# Patient Record
Sex: FEMALE | Race: WHITE | NOT HISPANIC OR LATINO | Employment: OTHER | URBAN - METROPOLITAN AREA
[De-identification: names, ages, dates, MRNs, and addresses within clinical notes are randomized per-mention and may not be internally consistent; named-entity substitution may affect disease eponyms.]

---

## 2023-09-20 PROBLEM — R26.2 DIFFICULTY WALKING: Status: ACTIVE | Noted: 2023-09-20

## 2023-09-20 PROBLEM — M19.90 DEGENERATIVE JOINT DISEASE: Status: ACTIVE | Noted: 2023-09-20

## 2023-09-20 PROBLEM — M54.30 SCIATICA: Status: ACTIVE | Noted: 2023-09-20

## 2023-09-20 PROBLEM — K21.9 GASTROESOPHAGEAL REFLUX DISEASE: Status: ACTIVE | Noted: 2023-09-20

## 2023-09-20 PROBLEM — M19.079 ARTHRITIS, MIDFOOT: Status: ACTIVE | Noted: 2023-09-20

## 2023-09-20 PROBLEM — J44.9 CHRONIC OBSTRUCTIVE PULMONARY DISEASE (MULTI): Status: ACTIVE | Noted: 2023-09-20

## 2023-09-20 PROBLEM — R26.9 GAIT ABNORMALITY: Status: ACTIVE | Noted: 2023-09-20

## 2023-09-20 PROBLEM — R53.1 WEAKNESS GENERALIZED: Status: ACTIVE | Noted: 2023-09-20

## 2023-09-20 PROBLEM — M47.816 LUMBAR SPONDYLOSIS: Status: ACTIVE | Noted: 2023-09-20

## 2023-09-20 RX ORDER — GABAPENTIN 300 MG/1
CAPSULE ORAL
COMMUNITY
Start: 2022-08-19 | End: 2024-01-30 | Stop reason: WASHOUT

## 2023-09-20 RX ORDER — CHOLECALCIFEROL (VITAMIN D3) 125 MCG
CAPSULE ORAL
COMMUNITY
Start: 2015-11-10

## 2023-09-20 RX ORDER — HYDROCODONE BITARTRATE AND ACETAMINOPHEN 5; 325 MG/1; MG/1
TABLET ORAL
COMMUNITY
End: 2024-01-30 | Stop reason: WASHOUT

## 2023-09-20 RX ORDER — ALBUTEROL SULFATE 90 UG/1
AEROSOL, METERED RESPIRATORY (INHALATION)
COMMUNITY
Start: 2020-12-14

## 2023-09-20 RX ORDER — ASPIRIN 325 MG
1 TABLET, DELAYED RELEASE (ENTERIC COATED) ORAL DAILY
COMMUNITY
Start: 2019-02-26

## 2023-09-20 RX ORDER — PHENYLPROPANOLAMINE/CLEMASTINE 75-1.34MG
TABLET, EXTENDED RELEASE ORAL
COMMUNITY
Start: 2015-11-10

## 2023-11-29 ENCOUNTER — TELEPHONE (OUTPATIENT)
Dept: PRIMARY CARE | Facility: CLINIC | Age: 78
End: 2023-11-29
Payer: MEDICARE

## 2023-11-29 DIAGNOSIS — J44.1 CHRONIC OBSTRUCTIVE PULMONARY DISEASE WITH ACUTE EXACERBATION (MULTI): Primary | ICD-10-CM

## 2023-11-29 RX ORDER — PREDNISONE 20 MG/1
40 TABLET ORAL DAILY
Qty: 10 TABLET | Refills: 0 | Status: SHIPPED | OUTPATIENT
Start: 2023-11-29 | End: 2023-12-04

## 2023-11-29 RX ORDER — AZITHROMYCIN 250 MG/1
TABLET, FILM COATED ORAL
Qty: 6 TABLET | Refills: 0 | Status: SHIPPED | OUTPATIENT
Start: 2023-11-29 | End: 2023-12-04

## 2024-01-29 PROBLEM — E78.2 MIXED HYPERLIPIDEMIA: Status: ACTIVE | Noted: 2024-01-29

## 2024-01-29 PROBLEM — M79.673 PAIN OF FOOT: Status: RESOLVED | Noted: 2024-01-29 | Resolved: 2024-01-29

## 2024-01-29 PROBLEM — S86.312A PERONEAL TENDON TEAR, LEFT, INITIAL ENCOUNTER: Status: RESOLVED | Noted: 2022-05-02 | Resolved: 2024-01-29

## 2024-01-29 PROBLEM — R73.9 HYPERGLYCEMIA, UNSPECIFIED: Status: ACTIVE | Noted: 2024-01-29

## 2024-01-29 PROBLEM — M15.9 GENERALIZED OSTEOARTHRITIS: Status: ACTIVE | Noted: 2024-01-29

## 2024-01-29 PROBLEM — R06.02 SHORTNESS OF BREATH: Status: RESOLVED | Noted: 2024-01-29 | Resolved: 2024-01-29

## 2024-01-29 PROBLEM — E55.9 VITAMIN D DEFICIENCY, UNSPECIFIED: Status: ACTIVE | Noted: 2024-01-29

## 2024-01-29 PROBLEM — M25.559 ARTHRALGIA OF HIP: Status: RESOLVED | Noted: 2024-01-29 | Resolved: 2024-01-29

## 2024-01-29 PROBLEM — R09.89 RESPIRATORY SYMPTOMS: Status: RESOLVED | Noted: 2024-01-29 | Resolved: 2024-01-29

## 2024-01-29 PROBLEM — E03.9 HYPOTHYROIDISM, UNSPECIFIED: Status: ACTIVE | Noted: 2024-01-29

## 2024-01-29 PROBLEM — M25.572 ACUTE LEFT ANKLE PAIN: Status: RESOLVED | Noted: 2022-05-02 | Resolved: 2024-01-29

## 2024-01-30 ENCOUNTER — OFFICE VISIT (OUTPATIENT)
Dept: PRIMARY CARE | Facility: CLINIC | Age: 79
End: 2024-01-30
Payer: MEDICARE

## 2024-01-30 VITALS
HEART RATE: 80 BPM | WEIGHT: 212 LBS | DIASTOLIC BLOOD PRESSURE: 80 MMHG | SYSTOLIC BLOOD PRESSURE: 122 MMHG | OXYGEN SATURATION: 97 % | BODY MASS INDEX: 31.31 KG/M2

## 2024-01-30 DIAGNOSIS — J32.9 SINUSITIS, UNSPECIFIED CHRONICITY, UNSPECIFIED LOCATION: Primary | ICD-10-CM

## 2024-01-30 PROCEDURE — 1036F TOBACCO NON-USER: CPT | Performed by: LICENSED PRACTICAL NURSE

## 2024-01-30 PROCEDURE — 99213 OFFICE O/P EST LOW 20 MIN: CPT | Performed by: LICENSED PRACTICAL NURSE

## 2024-01-30 PROCEDURE — 1160F RVW MEDS BY RX/DR IN RCRD: CPT | Performed by: LICENSED PRACTICAL NURSE

## 2024-01-30 PROCEDURE — 1159F MED LIST DOCD IN RCRD: CPT | Performed by: LICENSED PRACTICAL NURSE

## 2024-01-30 PROCEDURE — 1125F AMNT PAIN NOTED PAIN PRSNT: CPT | Performed by: LICENSED PRACTICAL NURSE

## 2024-01-30 RX ORDER — AMOXICILLIN AND CLAVULANATE POTASSIUM 875; 125 MG/1; MG/1
875 TABLET, FILM COATED ORAL 2 TIMES DAILY
Qty: 20 TABLET | Refills: 0 | Status: SHIPPED | OUTPATIENT
Start: 2024-01-30 | End: 2024-02-09

## 2024-01-30 RX ORDER — ASCORBIC ACID 250 MG
250 TABLET ORAL DAILY
COMMUNITY

## 2024-01-30 RX ORDER — CHOLECALCIFEROL (VITAMIN D3) 25 MCG
1000 TABLET ORAL DAILY
COMMUNITY

## 2024-01-30 RX ORDER — ACETAMINOPHEN 500 MG
500 TABLET ORAL EVERY 6 HOURS PRN
COMMUNITY

## 2024-01-30 RX ORDER — BISMUTH SUBSALICYLATE 262 MG
1 TABLET,CHEWABLE ORAL DAILY
COMMUNITY

## 2024-01-30 ASSESSMENT — PATIENT HEALTH QUESTIONNAIRE - PHQ9
2. FEELING DOWN, DEPRESSED OR HOPELESS: NOT AT ALL
SUM OF ALL RESPONSES TO PHQ9 QUESTIONS 1 AND 2: 0
1. LITTLE INTEREST OR PLEASURE IN DOING THINGS: NOT AT ALL

## 2024-01-30 ASSESSMENT — ENCOUNTER SYMPTOMS
SHORTNESS OF BREATH: 0
COUGH: 1
OCCASIONAL FEELINGS OF UNSTEADINESS: 1
LOSS OF SENSATION IN FEET: 0
RHINORRHEA: 1
DEPRESSION: 0

## 2024-01-30 ASSESSMENT — PAIN SCALES - GENERAL: PAINLEVEL: 8

## 2024-01-30 NOTE — PROGRESS NOTES
"[facility]: [facility]  PROGRESS NOTE      Mariaelena Goodwin is a 79 y.o. female that is presenting today for Follow-up (Cough, congestion at night, pt states that she can't cough up anything ).      Subjective   Pt presents to the office today for concerns of a cough that is unbearable at night. She notes onset of the cough 2 weeks ago. She notes also having a runny nose, nasal congestion, tender lymph nodes, and ear pain. She also had a sore throat for 3 days. She has tried Nyquil PM      Review of Systems   HENT:  Positive for congestion, ear pain and rhinorrhea.    Respiratory:  Positive for cough. Negative for shortness of breath.    Cardiovascular:  Negative for chest pain.      Objective   Vitals:    01/30/24 1004   BP: 122/80   Pulse: 80   SpO2: 97%      Body mass index is 31.31 kg/m².  Physical Exam  HENT:      Right Ear: Hearing normal. There is no impacted cerumen.      Left Ear: Hearing, tympanic membrane, ear canal and external ear normal. There is no impacted cerumen.      Ears:      Comments: Very minimal amount of erythema to the right TM.   Cardiovascular:      Rate and Rhythm: Normal rate and regular rhythm.   Pulmonary:      Breath sounds: Normal breath sounds. No decreased breath sounds, wheezing, rhonchi or rales.       Diagnostic Results   Lab Results   Component Value Date    GLUCOSE 110 (H) 02/26/2019    CALCIUM 8.9 02/26/2019     02/26/2019    K 4.0 02/26/2019    CO2 26 02/26/2019     02/26/2019    BUN 8 02/26/2019    CREATININE 0.95 02/26/2019     No results found for: \"ALT\", \"AST\", \"GGT\", \"ALKPHOS\", \"BILITOT\"  Lab Results   Component Value Date    WBC 7.3 02/26/2019    HGB 13.7 02/26/2019    HCT 41.3 02/26/2019    MCV 93 02/26/2019     02/26/2019     No results found for: \"CHOL\"  No results found for: \"HDL\"  No results found for: \"LDLCALC\"  No results found for: \"TRIG\"  No components found for: \"CHOLHDL\"  No results found for: \"HGBA1C\"  Other labs " not included in the list above were reviewed either before or during this encounter.    History    Past Medical History:   Diagnosis Date    Acute left ankle pain 05/02/2022    Arthralgia of hip 01/29/2024    Pain of foot 01/29/2024    Peroneal tendon tear, left, initial encounter 05/02/2022    Personal history of other diseases of the respiratory system 11/20/2020    History of sinus problem    Respiratory symptoms 01/29/2024    Shortness of breath 01/29/2024     Past Surgical History:   Procedure Laterality Date    OTHER SURGICAL HISTORY  11/20/2020    Hip replacement    OTHER SURGICAL HISTORY  11/20/2020    Appendectomy    ROTATOR CUFF REPAIR  11/10/2015    Rotator Cuff Repair    TOTAL KNEE ARTHROPLASTY  11/10/2015    Knee Replacement     Family History   Problem Relation Name Age of Onset    Heart disease Mother      Hypertension Mother       Social History     Socioeconomic History    Marital status:      Spouse name: Not on file    Number of children: Not on file    Years of education: Not on file    Highest education level: Not on file   Occupational History    Not on file   Tobacco Use    Smoking status: Never    Smokeless tobacco: Never   Vaping Use    Vaping Use: Never used   Substance and Sexual Activity    Alcohol use: Yes     Alcohol/week: 7.0 standard drinks of alcohol     Types: 7 Standard drinks or equivalent per week    Drug use: Never    Sexual activity: Not on file   Other Topics Concern    Not on file   Social History Narrative    Not on file     Social Determinants of Health     Financial Resource Strain: Not on file   Food Insecurity: Not on file   Transportation Needs: Not on file   Physical Activity: Not on file   Stress: Not on file   Social Connections: Not on file   Intimate Partner Violence: Not on file   Housing Stability: Not on file     Allergies   Allergen Reactions    Latex Rash     Current Outpatient Medications on File Prior to Visit   Medication Sig Dispense Refill     albuterol (Ventolin HFA) 90 mcg/actuation inhaler INHALE 1-2 PUFFS EVERY 4-6 HOURS AS NEEDED AND AS DIRECTED.      ascorbic acid (Vitamin C) 250 mg tablet Take 1 tablet (250 mg) by mouth once daily.      aspirin 325 mg EC tablet Take 1 tablet (325 mg) by mouth once daily.      cholecalciferol (Vitamin D3) 25 MCG (1000 UT) tablet Take 1 tablet (1,000 Units) by mouth once daily.      ibuprofen (Advil Migraine) capsule TAKE 1-2 CAPSULES  EVERY 4 TO 6 HOURS.      multivitamin tablet Take 1 tablet by mouth once daily.      naproxen sodium (Aleve) 220 mg capsule Take 1 capsule twice daily as needed      acetaminophen (Tylenol) 500 mg tablet Take 1 tablet (500 mg) by mouth every 6 hours if needed for mild pain (1 - 3).      [DISCONTINUED] gabapentin (Neurontin) 300 mg capsule 1 capsule Orally every 12 hours for 30 day(s)      [DISCONTINUED] HYDROcodone-acetaminophen (Norco) 5-325 mg tablet 1 tab(s) orally every 6 hours       No current facility-administered medications on file prior to visit.     Immunization History   Administered Date(s) Administered    Flu vaccine, quadrivalent, high-dose, preservative free, age 65y+ (FLUZONE) 09/12/2023    Hepatitis A vaccine, age 19 years and greater (HAVRIX) 11/10/2015, 02/07/2017    Influenza, High Dose Seasonal, Preservative Free 10/17/2017, 09/28/2018    Influenza, seasonal, injectable 02/07/2017    Vivian SARS-CoV-2 Vaccination 08/05/2021, 11/29/2021    Tdap vaccine, age 7 year and older (BOOSTRIX, ADACEL) 10/01/2011    Typhoid, oral 02/07/2017     Patient's medical history was reviewed and updated either before or during this encounter.       Assessment/Plan   Problem List Items Addressed This Visit    None  Visit Diagnoses       Sinusitis, unspecified chronicity, unspecified location    -  Primary    Relevant Medications    amoxicillin-pot clavulanate (Augmentin) 875-125 mg tablet            Robbie Guerra, APRN-CNP

## 2024-01-30 NOTE — PATIENT INSTRUCTIONS
It was nice meeting you today Mariaelena. I'm sorry you have been experiencing this cough. I'll prescribe you an ATB and this should help you.

## 2024-02-12 ENCOUNTER — TELEPHONE (OUTPATIENT)
Dept: PRIMARY CARE | Facility: CLINIC | Age: 79
End: 2024-02-12
Payer: MEDICARE

## 2024-02-12 DIAGNOSIS — J32.9 SINUSITIS, UNSPECIFIED CHRONICITY, UNSPECIFIED LOCATION: Primary | ICD-10-CM

## 2024-02-12 RX ORDER — DOXYCYCLINE 100 MG/1
100 CAPSULE ORAL 2 TIMES DAILY
Qty: 14 CAPSULE | Refills: 0 | Status: SHIPPED | OUTPATIENT
Start: 2024-02-12 | End: 2024-02-19

## 2024-02-12 NOTE — TELEPHONE ENCOUNTER
Pt c/o unresolved fatigue, sinus congestion and cough at night. States that it went away after atb and started again 2 days ago. Negative COVID test 3 days ago. Please advise.

## 2024-08-20 ENCOUNTER — PREP FOR PROCEDURE (OUTPATIENT)
Dept: ORTHOPEDIC SURGERY | Facility: HOSPITAL | Age: 79
End: 2024-08-20
Payer: MEDICARE

## 2024-08-20 DIAGNOSIS — M17.11 PRIMARY OSTEOARTHRITIS OF RIGHT KNEE: Primary | ICD-10-CM

## 2024-08-20 RX ORDER — SODIUM CHLORIDE, SODIUM LACTATE, POTASSIUM CHLORIDE, CALCIUM CHLORIDE 600; 310; 30; 20 MG/100ML; MG/100ML; MG/100ML; MG/100ML
20 INJECTION, SOLUTION INTRAVENOUS CONTINUOUS
OUTPATIENT
Start: 2024-08-20

## 2024-08-29 ENCOUNTER — TELEPHONE (OUTPATIENT)
Dept: INPATIENT UNIT | Facility: HOSPITAL | Age: 79
End: 2024-08-29
Payer: MEDICARE

## 2024-08-29 NOTE — CARE PLAN
Notified by Ortho coordinator that patient had questions about discharge planning after her surgery on 09/23/24. Spoke to Mrs Goodwin with ortho coordinator Lalita via phone. Explained to her that she would not be able to go to [facility] under her insurance because she will most likely not meet inpatient criteria (joint replacements are classified as extended recovery) per medicare guidelines. Offered the option of private pay to [facility] and patient had expressed how much would that cost. TCC called over to [facility] to see what private pay will cost the patient. Spoke to [facility] admissions and she stated that the private pay rate is $435.00/day. Weekly rate is $3,045.00. Admission to [facility] will depend on day of discharge will also depend on bed availability. this will all depend on bed availability at the time of discharge. Will reach out to patient to make her aware of the private pay cost.

## 2024-08-30 NOTE — CARE PLAN
Spoke with patient over the phone and discussed with her the private pay rate for [facility]. Explained the daily rate as well as what the weekly rate would be. Patient would like to speak to her  about the cost before a decision is made. Patient will let the team know when she has her surgery if she would like to go to [facility] for a week private pay after she is discharged from the hospital.

## 2024-09-05 ENCOUNTER — PRE-ADMISSION TESTING (OUTPATIENT)
Dept: PREADMISSION TESTING | Facility: HOSPITAL | Age: 79
End: 2024-09-05
Payer: MEDICARE

## 2024-09-05 DIAGNOSIS — Z01.818 PRE-OPERATIVE EXAMINATION: Primary | ICD-10-CM

## 2024-09-05 DIAGNOSIS — M17.11 PRIMARY OSTEOARTHRITIS OF RIGHT KNEE: ICD-10-CM

## 2024-09-05 LAB
ALBUMIN SERPL BCP-MCNC: 4 G/DL (ref 3.4–5)
ALP SERPL-CCNC: 67 U/L (ref 33–136)
ALT SERPL W P-5'-P-CCNC: 14 U/L (ref 7–45)
ANION GAP SERPL CALC-SCNC: 11 MMOL/L (ref 10–20)
AST SERPL W P-5'-P-CCNC: 16 U/L (ref 9–39)
ATRIAL RATE: 67 BPM
BASOPHILS # BLD AUTO: 0.04 X10*3/UL (ref 0–0.1)
BASOPHILS NFR BLD AUTO: 0.6 %
BILIRUB SERPL-MCNC: 0.8 MG/DL (ref 0–1.2)
BUN SERPL-MCNC: 13 MG/DL (ref 6–23)
CALCIUM SERPL-MCNC: 9.6 MG/DL (ref 8.6–10.3)
CHLORIDE SERPL-SCNC: 104 MMOL/L (ref 98–107)
CO2 SERPL-SCNC: 28 MMOL/L (ref 21–32)
CREAT SERPL-MCNC: 0.74 MG/DL (ref 0.5–1.05)
EGFRCR SERPLBLD CKD-EPI 2021: 82 ML/MIN/1.73M*2
EOSINOPHIL # BLD AUTO: 0.24 X10*3/UL (ref 0–0.4)
EOSINOPHIL NFR BLD AUTO: 3.8 %
ERYTHROCYTE [DISTWIDTH] IN BLOOD BY AUTOMATED COUNT: 12.8 % (ref 11.5–14.5)
GLUCOSE SERPL-MCNC: 76 MG/DL (ref 74–99)
HCT VFR BLD AUTO: 45.4 % (ref 36–46)
HGB BLD-MCNC: 15.3 G/DL (ref 12–16)
IMM GRANULOCYTES # BLD AUTO: 0.03 X10*3/UL (ref 0–0.5)
IMM GRANULOCYTES NFR BLD AUTO: 0.5 % (ref 0–0.9)
LYMPHOCYTES # BLD AUTO: 2.02 X10*3/UL (ref 0.8–3)
LYMPHOCYTES NFR BLD AUTO: 32 %
MCH RBC QN AUTO: 31.1 PG (ref 26–34)
MCHC RBC AUTO-ENTMCNC: 33.7 G/DL (ref 32–36)
MCV RBC AUTO: 92 FL (ref 80–100)
MONOCYTES # BLD AUTO: 0.41 X10*3/UL (ref 0.05–0.8)
MONOCYTES NFR BLD AUTO: 6.5 %
NEUTROPHILS # BLD AUTO: 3.57 X10*3/UL (ref 1.6–5.5)
NEUTROPHILS NFR BLD AUTO: 56.6 %
NRBC BLD-RTO: 0 /100 WBCS (ref 0–0)
P AXIS: 43 DEGREES
P OFFSET: 206 MS
P ONSET: 155 MS
PLATELET # BLD AUTO: 304 X10*3/UL (ref 150–450)
POTASSIUM SERPL-SCNC: 4.4 MMOL/L (ref 3.5–5.3)
PR INTERVAL: 142 MS
PROT SERPL-MCNC: 6.3 G/DL (ref 6.4–8.2)
Q ONSET: 226 MS
QRS COUNT: 11 BEATS
QRS DURATION: 80 MS
QT INTERVAL: 416 MS
QTC CALCULATION(BAZETT): 439 MS
QTC FREDERICIA: 431 MS
R AXIS: 4 DEGREES
RBC # BLD AUTO: 4.92 X10*6/UL (ref 4–5.2)
SODIUM SERPL-SCNC: 139 MMOL/L (ref 136–145)
T AXIS: 28 DEGREES
T OFFSET: 434 MS
VENTRICULAR RATE: 67 BPM
WBC # BLD AUTO: 6.3 X10*3/UL (ref 4.4–11.3)

## 2024-09-05 PROCEDURE — 84075 ASSAY ALKALINE PHOSPHATASE: CPT | Performed by: REGISTERED NURSE

## 2024-09-05 PROCEDURE — 85025 COMPLETE CBC W/AUTO DIFF WBC: CPT | Performed by: REGISTERED NURSE

## 2024-09-05 PROCEDURE — 36415 COLL VENOUS BLD VENIPUNCTURE: CPT

## 2024-09-05 PROCEDURE — 87081 CULTURE SCREEN ONLY: CPT | Mod: GEALAB | Performed by: REGISTERED NURSE

## 2024-09-05 PROCEDURE — 93005 ELECTROCARDIOGRAM TRACING: CPT

## 2024-09-05 PROCEDURE — 99204 OFFICE O/P NEW MOD 45 MIN: CPT | Performed by: REGISTERED NURSE

## 2024-09-05 RX ORDER — ASPIRIN 81 MG/1
81 TABLET ORAL DAILY
COMMUNITY

## 2024-09-05 RX ORDER — CHLORHEXIDINE GLUCONATE ORAL RINSE 1.2 MG/ML
15 SOLUTION DENTAL DAILY
Qty: 120 ML | Refills: 0 | Status: SHIPPED | OUTPATIENT
Start: 2024-09-05 | End: 2024-09-07

## 2024-09-05 ASSESSMENT — ENCOUNTER SYMPTOMS
CONSTITUTIONAL NEGATIVE: 1
EYES NEGATIVE: 1
RESPIRATORY NEGATIVE: 1
NECK NEGATIVE: 1
GASTROINTESTINAL NEGATIVE: 1
NEUROLOGICAL NEGATIVE: 1
ARTHRALGIAS: 1
SKIN CHANGES: 1
CARDIOVASCULAR NEGATIVE: 1
LIMITED RANGE OF MOTION: 1

## 2024-09-05 ASSESSMENT — PAIN SCALES - GENERAL: PAINLEVEL_OUTOF10: 8

## 2024-09-05 ASSESSMENT — DUKE ACTIVITY SCORE INDEX (DASI)
TOTAL_SCORE: 28.95
CAN YOU RUN A SHORT DISTANCE: NO
CAN YOU WALK INDOORS, SUCH AS AROUND YOUR HOUSE: YES
CAN YOU CLIMB A FLIGHT OF STAIRS OR WALK UP A HILL: YES
CAN YOU DO HEAVY WORK AROUND THE HOUSE LIKE SCRUBBING FLOORS OR LIFTING AND MOVING HEAVY FURNITURE: NO
CAN YOU WALK A BLOCK OR TWO ON LEVEL GROUND: NO
CAN YOU DO MODERATE WORK AROUND THE HOUSE LIKE VACUUMING, SWEEPING FLOORS OR CARRYING GROCERIES: YES
CAN YOU PARTICIPATE IN MODERATE RECREATIONAL ACTIVITIES LIKE GOLF, BOWLING, DANCING, DOUBLES TENNIS OR THROWING A BASEBALL OR FOOTBALL: NO
DASI METS SCORE: 6.3
CAN YOU HAVE SEXUAL RELATIONS: YES
CAN YOU TAKE CARE OF YOURSELF (EAT, DRESS, BATHE, OR USE TOILET): YES
CAN YOU DO LIGHT WORK AROUND THE HOUSE LIKE DUSTING OR WASHING DISHES: YES
CAN YOU DO YARD WORK LIKE RAKING LEAVES, WEEDING OR PUSHING A MOWER: NO
CAN YOU PARTICIPATE IN STRENOUS SPORTS LIKE SWIMMING, SINGLES TENNIS, FOOTBALL, BASKETBALL, OR SKIING: YES

## 2024-09-05 ASSESSMENT — PAIN - FUNCTIONAL ASSESSMENT: PAIN_FUNCTIONAL_ASSESSMENT: 0-10

## 2024-09-05 ASSESSMENT — LIFESTYLE VARIABLES: SMOKING_STATUS: NONSMOKER

## 2024-09-05 ASSESSMENT — ACTIVITIES OF DAILY LIVING (ADL): ADL_SCORE: 0

## 2024-09-05 NOTE — SIGNIFICANT EVENT
Procedure:  Right total knee arthroplasty    Date of procedure:  9/23/24    Address patient is going to be discharged to:  [address] [id number]    Who do you reside with at home and will someone be available to stay with you for at least the first 24 hours:  States she does not have reliable person to be with her for first 24 hours. States her  might not be able to be with her and therefore she spoke with Dr. Arambula about this and she wants to stay overnight.    Did you or are you going to [facility], if so, what date:  Not planning to go.   States she went in 2019 when she did L TKR.    What is your current mobility status:  Ambulates with assistance of surroundings or leaning on another person. Has a cane but does not use it.     Stairs in home:  One flight, bedroom is upstairs    Equipment at home:  Raised toilet seat    All chronic medications refilled and available at home:  No rx medications    [facility] for [facility] medications:  [facility]    Same day or overnight stay:  overnight

## 2024-09-05 NOTE — H&P (VIEW-ONLY)
CPM/PAT Evaluation       Name: Mariaelena Goodwin (Mariaelena Goodwin)  /Age: 1945/79 y.o.     In-Person       Chief Complaint: Evaluation prior to surgery    HPI    Past Medical History:   Diagnosis Date    Acute left ankle pain 2022    Arthralgia of hip 2024    Arthritis     COPD (chronic obstructive pulmonary disease) (Multi)     Pain of foot 2024    Peroneal tendon tear, left, initial encounter 2022    Personal history of other diseases of the respiratory system 2020    History of sinus problem    Respiratory symptoms 2024    Shortness of breath 2024       Past Surgical History:   Procedure Laterality Date    CATARACT EXTRACTION      OTHER SURGICAL HISTORY Right     Hip replacement    OTHER SURGICAL HISTORY      Appendectomy    ROTATOR CUFF REPAIR Right     Rotator Cuff Repair    TOTAL KNEE ARTHROPLASTY Left     Knee Replacement       Patient  has no history on file for sexual activity.    Family History   Problem Relation Name Age of Onset    Heart disease Mother      Hypertension Mother         Allergies   Allergen Reactions    Latex Rash       Prior to Admission medications    Medication Sig Start Date End Date Taking? Authorizing Provider   ascorbic acid (Vitamin C) 250 mg tablet Take 1 tablet (250 mg) by mouth once daily.   Yes Historical Provider, MD   aspirin 81 mg EC tablet Take 1 tablet (81 mg) by mouth once daily.   Yes Historical Provider, MD   cholecalciferol (Vitamin D3) 25 MCG (1000 UT) tablet Take 1 tablet (1,000 Units) by mouth once daily.   Yes Historical Provider, MD   ibuprofen/diphenhydramine cit (ADVIL PM ORAL) Take by mouth.   Yes Historical Provider, MD   multivitamin tablet Take 1 tablet by mouth once daily.   Yes Historical Provider, MD   acetaminophen (Tylenol) 500 mg tablet Take 1 tablet (500 mg) by mouth every 6 hours if needed for mild pain (1 - 3).    Historical Provider, MD   albuterol (Ventolin HFA) 90 mcg/actuation inhaler INHALE 1-2  PUFFS EVERY 4-6 HOURS AS NEEDED AND AS DIRECTED. 12/14/20   Historical Provider, MD   chlorhexidine (Peridex) 0.12 % solution Use 15 mL in the mouth or throat once daily for 2 days. Use 15ml once the night before surgery and 15ml once the morning of surgery 9/5/24 9/7/24  Janeen Vargas, APRN-CNP   aspirin 325 mg EC tablet Take 1 tablet (325 mg) by mouth once daily. 2/26/19 9/5/24  Historical Provider, MD   ibuprofen (Advil Migraine) capsule TAKE 1-2 CAPSULES  EVERY 4 TO 6 HOURS. 11/10/15 9/5/24  Historical Provider, MD   naproxen sodium (Aleve) 220 mg capsule Take 1 capsule twice daily as needed 11/10/15 9/5/24  Historical Provider, MD BRIZUELA ROS:   Constitutional:   neg    Neuro/Psych:   neg    Eyes:   neg    Ears:   neg    Nose:   Mouth:   neg    Throat:   neg    Neck:    Soreness with ROM  neg    Cardio:   neg    Respiratory:   neg    Endocrine:   GI:   neg    :   neg    Musculoskeletal:    Right knee pain 8/10, chronic pain Left ankle   arthralgias   decreased ROM  Hematologic:   neg    Skin:   Healing small intact area on left lower arm, no redness or drainage,    skin changes      Physical Exam  Vitals reviewed.   Constitutional:       Appearance: Normal appearance.   HENT:      Head: Normocephalic and atraumatic.      Nose: Nose normal.      Mouth/Throat:      Mouth: Mucous membranes are moist.      Pharynx: Oropharynx is clear.   Neck:      Vascular: No carotid bruit.      Comments: Soreness with ROM  Cardiovascular:      Rate and Rhythm: Normal rate and regular rhythm.      Pulses: Normal pulses.      Heart sounds: Normal heart sounds.   Pulmonary:      Effort: Pulmonary effort is normal.      Breath sounds: Normal breath sounds.   Abdominal:      Palpations: Abdomen is soft.   Musculoskeletal:         General: Tenderness present. No swelling.      Cervical back: Normal range of motion and neck supple.      Right lower leg: No edema.      Left lower leg: No edema.      Comments: Abnormal gait due  to Left ankle pain and Right knee pain   Skin:     General: Skin is warm and dry.      Capillary Refill: Capillary refill takes less than 2 seconds.   Neurological:      General: No focal deficit present.      Mental Status: She is alert and oriented to person, place, and time.   Psychiatric:         Mood and Affect: Mood normal.         Behavior: Behavior normal.         Thought Content: Thought content normal.         Judgment: Judgment normal.          PAT AIRWAY:   Airway:     Mallampati::  IV    TM distance::  >3 FB    Neck ROM::  Limited  normal        There were no vitals taken for this visit.    DASI Risk Score      Flowsheet Row Most Recent Value   DASI SCORE 28.95   METS Score (Will be calculated only when all the questions are answered) 6.3          Caprini DVT Assessment      Flowsheet Row Most Recent Value   DVT Score 12   Current Status COPD, Major surgery planned, including arthroscopic and laproscopic (1-2 hours), Elective major lower extremity arthroplasty   History Prior major surgery   Age Over 75 years          Modified Frailty Index    No data to display       CHADS2 Stroke Risk  Current as of 48 minutes ago        N/A 3 to 100%: High Risk   2 to < 3%: Medium Risk   0 to < 2%: Low Risk     Last Change: N/A          This score determines the patient's risk of having a stroke if the patient has atrial fibrillation.        This score is not applicable to this patient. Components are not calculated.          Revised Cardiac Risk Index      Flowsheet Row Most Recent Value   Revised Cardiac Risk Calculator 0          Apfel Simplified Score      Flowsheet Row Most Recent Value   Apfel Simplified Score Calculator 3          Risk Analysis Index Results This Encounter         9/5/2024  0919             REED Cancer History: Patient does not indicate history of cancer    Total Risk Analysis Index Score Without Cancer: 24    Total Risk Analysis Index Score: 24          Stop Bang Score      Flowsheet Row Most  Recent Value   Do you snore loudly? 0   Do you often feel tired or fatigued after your sleep? 0   Has anyone ever observed you stop breathing in your sleep? 0   Do you have or are you being treated for high blood pressure? 0   Recent BMI (Calculated) 30.9   Is BMI greater than 35 kg/m2? 0=No   Age older than 50 years old? 1=Yes   Is your neck circumference greater than 17 inches (Male) or 16 inches (Female)? 0   Gender - Male 0=No   STOP-BANG Total Score 1              Assessment & Plan:    79 y.o.  female  scheduled for OPEN TOTAL KNEE ARTHROPLASTY - Right on 9/23/24 with Dr. Arambula for  Primary osteoarthritis of right knee.  PMHX includes HLD, hypothyroid, GERD, COPD, Sciatica, Spondylosis of lumbar spine, OA.  PAT consulted for perioperative risk stratification and optimization    Neuro:  No neurologic diagnosis or significant findings on chart review, clinical presentation and evaluation.  No grossly apparent neurologic perioperative risk.  age  Patient is at increased risk for perioperative CVA secondary to  increased age    HEENT:  No HEENT diagnosis or significant findings on chart review or clinical presentation and evaluation. No further preoperative testing/intervention indicated at this time.    Cardiovascular:  Hx HLD, diet controlled  METS: 6.3  RCRI: 0 points, 3.9%  risk for postoperative MACE   ELLIS: 0.8% risk for 30 day postoperative MACE    Pulmonary:  Hx COPD, has not used inhaler in over one month  Stop Bang score is 1 placing patient at low risk for TANNA  ARISCAT: <26 points, 1.6% risk of in-hospital postoperative pulmonary complication  PRODIGY: Moderate risk for opioid induced respiratory depression  Pulmonary education discussed, patient also provided deep breathing exercises and educational handout    Renal:   No renal diagnosis or significant findings on chart review, clinical presentation or evaluation. No further preoperative testing/intervention is indicated at this time.  Age equal to  or greater than 56    Urological/GYN  No diagnosis or significant findings on chart review or clinical presentation and evaluation.     Endocrine:  Hx hypothyroid    Hematologic:  No hematologic diagnosis or significant findings on chart review or clinical presentation and evaluation. No further testing or intervention is indicated at this time.     Antiplatelet management   Patient takes ASA 81 as cardio preventative  Anticoagulation management  The patient is not currently receiving anticoagulation therapy. Patient provided with DVT educational handout.    Caprini Score 12, patient at High risk for perioperative DVT.  Patient provided with VTE education/handout.    Gastrointestinal:   Hx GERD  Eat-10 score 0      Infectious disease:   No infectious diagnosis or significant findings on chart review or clinical presentation and evaluation.   Prescription provided for CHG body wash and dental rinse. CHG use instructions reviewed and provided to patient.  Staph screen collected    Musculoskeletal:   Primary osteoarthritis of right knee  Hx R SPENSER 2019, L TKA, R Rotator Cuff Repair, Left ankle chronic pain, Sciatica, Spondylosis of lumbar spine        Anesthesia/Airway:  No anesthesia complications      Medication instructions and NPO guidelines reviewed with the patient.  All questions or concerns discussed and addressed.      Labs and EKG ordered   CBC  CMP  MRSA  Peridex

## 2024-09-05 NOTE — PREPROCEDURE INSTRUCTIONS
Medication List            Accurate as of September 5, 2024  9:50 AM. Always use your most recent med list.                acetaminophen 500 mg tablet  Commonly known as: Tylenol  Medication Adjustments for Surgery: Take/Use as prescribed     ADVIL PM ORAL  Additional Medication Adjustments for Surgery: Take last dose 7 days before surgery     ascorbic acid 250 mg tablet  Commonly known as: Vitamin C  Additional Medication Adjustments for Surgery: Take last dose 7 days before surgery     aspirin 81 mg EC tablet  Additional Medication Adjustments for Surgery: Take last dose 7 days before surgery     chlorhexidine 0.12 % solution  Commonly known as: Peridex  Use 15 mL in the mouth or throat once daily for 2 days. Use 15ml once the night before surgery and 15ml once the morning of surgery  Medication Adjustments for Surgery: Take/Use as prescribed     multivitamin tablet  Additional Medication Adjustments for Surgery: Take last dose 7 days before surgery     Ventolin HFA 90 mcg/actuation inhaler  Generic drug: albuterol  Medication Adjustments for Surgery: Take/Use as prescribed     Vitamin D3 25 MCG (1000 UT) tablet  Generic drug: cholecalciferol  Additional Medication Adjustments for Surgery: Take last dose 7 days before surgery                                Thank you for visiting [facility]) today for your pre-procedure evaluation, you were seen by     Janeen Vargas CNP  [facility]mission [facility]  [facility]  [phone]    This summary includes instructions and information to aid you during your perioperative period.  Please read carefully. If you have any questions about your visit today, please call the number listed above.  If you become ill or have any changes to your health before your surgery, please contact your primary care provider and alert your surgeon.    Preparing for your Surgery       Exercises  Preoperative Deep Breathing Exercises  Why it is important to do deep  breathing exercises before my surgery?  Deep breathing exercises strengthen your breathing muscles.  This helps you to recover after your surgery and decreases the chance of breathing complications.  How are the deep breathing exercises done?  Sit straight with your back supported.  Breathe in deeply and slowly through your nose. Your lower rib cage should expand and your abdomen may move forward.  Hold that breath for 3 to 5 seconds.  Breathe out through pursed lips, slowly and completely.  Rest and repeat 10 times every hour while awake.  Rest longer if you become dizzy or lightheaded.       Preoperative Brain Exercises    What are brain exercises?  A brain exercise is any activity that engages your thinking (cognitive) skills.    What types of activities are considered brain exercises?  Jigsaw puzzles, crossword puzzles, word jumble, memory games, word search, and many more.  Many can be found free online or on your phone via a mobile flo.    Why should I do brain exercises before my surgery?  More recent research has shown brain exercise before surgery can lower the risk of postoperative delirium (confusion) which can be especially important for older adults.  Patients who did brain exercises for 5 to 10 hours the days before surgery, cut their risk of postoperative delirium in half up to 1 week after surgery.    Sit-to-Stand Exercise    What is the sit-to-stand exercise?  The sit-to-stand exercise strengthens the muscles of your lower body and muscles in the center of your body (core muscles for stability) helping to maintain and improve your strength and mobility.  How do I do the sit-to-stand exercise?  The goal is to do this exercise without using your arms or hands.  If this is too difficult, use your arms and hands or a chair with armrests to help slowly push yourself to the standing position and lower yourself back to the sitting position. As the movement becomes easier use your arms and hands less.     Steps to the sit-to-stand exercise  Sit up tall in a sturdy chair, knees bent, feet flat on the floor shoulder-width apart.  Shift your hips/pelvis forward in the chair to correctly position yourself for the next movement.  Lean forward at your hips.  Stand up straight putting equal weight on both feet.  Check to be sure you are properly aligned with the chair, in a slow controlled movement sit back down.  Repeat this exercise 10-15 times.  If needed you can do it fewer times until your strength improves.  Rest for 1 minute.  Do another 10-15 sit-to-stand exercises.  Try to do this in the morning and evening.        Instructions    Preoperative Fasting Guidelines    Why must I stop eating and drinking near surgery time?  With sedation, food or liquid in your stomach can enter your lungs causing serious complications  Food can increase nausea and vomiting  When do I need to stop eating and drinking before my surgery?      Do not eat any food or drink any liquids after midnight the night before your surgery/procedure.  You may have sips of water to take medications.            Simple things you can do to help prevent blood clots     Blood clots are blockages that can form in the body's veins. When a blood clot forms in your deep veins, it may be called a deep vein thrombosis, or DVT for short. Blood clots can happen in any part of the body where blood flows, but they are most common in the arms and legs. If a piece of a blood clot breaks free and travels to the lungs, it is called a pulmonary embolus (PE). A PE can be a very serious problem.         Being in the hospital or having surgery can raise your chances of getting a blood clot because you may not be well enough to move around as much as you normally do.         Ways you can help prevent blood clots in the hospital       Wearing SCDs  SCDs stands for Sequential Compression Devices.   SCDs are special sleeves that wrap around your legs. They attach to a pump  "that fills them with air to gently squeeze your legs every few minutes.  This helps return the blood in your legs to your heart.   SCDs should only be taken off when walking or bathing. SCDs may not be comfortable, but they can help save your life.              Pump SCD leg sleeves  Wearing compression stockings - if your doctor orders them. These special snug-fitting stockings gently squeeze your legs to help blood flow.       Walking. Walking helps move the blood in your legs.   If your doctor says it is ok, try walking the halls at least   5 times a day. Ask us to help you get up, so you don't fall.      Taking any blood-thinning medicines your doctor orders.              Ways you can help prevent blood clots at home         Wearing compression stockings - if your doctor orders them.   Walking - to help move the blood in your legs.    Taking any blood-thinning medicines your doctor orders.      Signs of a blood clot or PE    Tell your doctor or nurse right away if you have any of the problems listed below.         If you are at home, seek medical care right away. Call 911 for chest pain or problems breathing.            Signs of a blood clot (DVT) - such as pain, swelling, redness, or warmth in your arm or legs.  Signs of a pulmonary embolism (PE) - such as chest pain or feeling short of breath      Tobacco and Alcohol;  Do not drink alcohol or smoke within 24 hours of surgery.  It is best to quit smoking for as long as possible before any surgery or procedure.      Other Instructions  Why did I have my nose, under my arms, and groin swabbed? The purpose of the swab is to identify Staphylococcus aureus inside your nose or on your skin.  The swab was sent to the laboratory for culture.  A positive swab/culture for Staphylococcus aureus is called colonization or carriage.     What is Staphylococcus aureus? Staphylococcus aureus, also known as \"staph\", is a germ found on the skin or in the nose of healthy people.  " Sometimes Staphylococcus aureus can get into the body and cause an infection.  This can be minor (such as pimples, boils, or other skin problems).  It might also be serious (such as a blood infection, pneumonia, or a surgical site infection).     What is Staphylococcus aureus colonization or carriage? Colonization or carriage means that a person has the germ but is not sick from it.  These bacteria can be spread on the hands or when breathing or sneezing.   How is Staphylococcus aureus spread? It is most often spread by close contact with a person or item that carries it.   What happens if my culture is positive for Staphylococcus aureus? Your doctor/medical team will use this information to guide any antibiotic treatment which may be necessary.  Regardless of the culture results, we will clean the inside of your nose with a betadine swab just before you have your surgery.   Will I get an infection if I have Staphylococcus aureus in my nose or on my skin? Anyone can get an infection with Staphylococcus aureus.  However, the best way to reduce your risk of infection is to follow the instructions provided to you for the use of your CHG soap and dental rinse.      Body Wash:     What is a home preoperative antibacterial shower? This shower is a way of cleaning the skin with a germ-killing solution before surgery.  The solution contains chlorhexidine, commonly known as CHG.  CHG is a skin cleanser with germ-killing ability.  Let your doctor know if you are allergic to chlorhexidine.   Why do I need to take a preoperative antibacterial shower? Skin is not sterile.  It is best to try to make your skin as free of germs as possible before surgery.  Proper cleansing with a germ-killing soap before surgery can lower the number of germs on your skin.  This helps to reduce the risk of infection at the surgical site.    Following the instructions listed below will help you prepare your skin for surgery.   How do I use the  solution? Steps:  Begin using your CHG soap 5 days before your scheduled surgery.      Oral/Dental Rinse:     What is oral/dental rinse?  It is a mouthwash. It is a way of cleaning the mouth with a germ-killing solution before your surgery.  The solution contains chlorhexidine, commonly known as CHG. It is used inside the mouth to kill a bacteria known as Staphylococcus aureus.  Let your doctor know if you are allergic to Chlorhexidine.   Why do I need to use CHG oral/dental rinse? The CHG oral/dental rinse helps to kill a bacteria in your mouth known as Staphylococcus aureus.  This reduces the risk of infection at the surgical site.    Using your CHG oral/dental rinse STEPS: Use your CHG oral/dental rinse after you brush your teeth the night before (at bedtime) and the morning of your surgery.  Follow all directions on your prescription label.    Use the cap on the container to measure 15 ml.  Swish (gargle if you can) the mouthwash in your mouth for at least 30 seconds, (do not swallow) and spit out.  After you use your CHG rinse, do not rinse your mouth with water, drink or eat.    Please refer to the prescription label for the appropriate time to resume oral intake   What side effects might I have using the CHG oral/dental rinse? CHG rinse will stick to plaque on the teeth.  Brush and floss just before use.  Teeth brushing will help avoid staining of plaque during use.   The Week before Surgery        Seven days before Surgery  Check your PAT medication instructions  Do the exercises provided to you by PAT  Arrange for a responsible, adult licensed  to take you home after surgery and stay with you for 24 hours.  You will not be permitted to drive yourself home if you have received any anesthetic/sedation  Six days before surgery  Check your PAT medication instructions  Do the exercises provided to you by PAT  Start using Chlorhexidene (CHG) body wash if prescribed  Five days before surgery  Check your PAT  medication instructions  Do the exercises provided to you by PAT  Continue to use CHG body wash if prescribed  Three days before surgery  Check your PAT medication instructions  Do the exercises provided to you by PAT  Continue to use CHG body wash if prescribed  Two days before surgery  Check your PAT medication instructions  Do the exercises provided to you by PAT  Continue to use CHG body wash if prescribed    The Day before Surgery       Check your PAT medication and all other PAT instructions including when to stop eating and drinking  You will be called with your arrival time for surgery in the late afternoon.  If you do not receive a call please reach out to your surgeon's office.  Do not smoke or drink 24 hours before surgery  Prepare items to bring with you to the hospital  Shower with your chlorhexidine wash if prescribed  Brush your teeth and use your chlorhexidine dental rinse if prescribed    The Day of Surgery       Check your PAT medication instructions  Ensure you follow the instructions for when to stop eating and drinking  Shower, if prescribed use CHG.  Do not apply any lotions, creams, moisturizers, perfume or deodorant  Brush your teeth and use your CHG dental rinse if prescribed  Wear loose comfortable clothing  Avoid make-up  Remove  jewelry and piercings, consider professional piercing removal with a plastic spacer if needed  Bring photo ID and Insurance card  Bring an accurate medication list that includes medication dose, frequency and allergies  Bring a copy of your advanced directives (will, health care power of )  Bring any devices and controllers as well as medical devices you have been provided with for surgery (CPAP, slings, braces, etc.)  Dentures, eyeglasses, and contacts will be removed before surgery, please bring cases for contacts or glasses

## 2024-09-07 LAB — STAPHYLOCOCCUS SPEC CULT: NORMAL

## 2024-09-12 ENCOUNTER — APPOINTMENT (OUTPATIENT)
Dept: ORTHOPEDIC SURGERY | Facility: CLINIC | Age: 79
End: 2024-09-12
Payer: MEDICARE

## 2024-09-13 ENCOUNTER — APPOINTMENT (OUTPATIENT)
Dept: PRIMARY CARE | Facility: CLINIC | Age: 79
End: 2024-09-13
Payer: MEDICARE

## 2024-09-18 ENCOUNTER — ANESTHESIA EVENT (OUTPATIENT)
Dept: OPERATING ROOM | Facility: HOSPITAL | Age: 79
End: 2024-09-18
Payer: MEDICARE

## 2024-09-19 RX ORDER — HYDROCODONE BITARTRATE AND ACETAMINOPHEN 5; 325 MG/1; MG/1
2 TABLET ORAL EVERY 8 HOURS PRN
Qty: 42 TABLET | Refills: 0 | Status: SHIPPED | OUTPATIENT
Start: 2024-09-19 | End: 2024-09-24

## 2024-09-19 RX ORDER — DOCUSATE SODIUM 100 MG/1
100 CAPSULE, LIQUID FILLED ORAL 2 TIMES DAILY
Qty: 20 CAPSULE | Refills: 0 | Status: SHIPPED | OUTPATIENT
Start: 2024-09-19 | End: 2024-09-29

## 2024-09-19 RX ORDER — ASPIRIN 325 MG
325 TABLET ORAL DAILY
Qty: 14 TABLET | Refills: 0 | Status: SHIPPED | OUTPATIENT
Start: 2024-09-19 | End: 2024-10-03

## 2024-09-19 NOTE — DISCHARGE INSTRUCTIONS
Post op appointment on 10/8/24 at 915 am at the [facility]                            DAVID Hernandez, RN-BC Orthopedic   for [facility] - Phone: [phone]    Lefty Arambula D.O.  Phone: [phone]    POSTOPERATIVE INSTRUCTIONS: TOTAL HIP & TOTAL KNEE ARTHROPLASTY    General/highlights: Flex/tighten the muscles in the buttocks, thighs and calves often when in chair or bed.  Utilize the incentive spirometer often especially the next 3 days.  Walk at least 10 steps every hour that you are awake.  Take stairs 1 at a time, good leg leads up, bed leg legs down, use the handrails.  You may be weightbearing as tolerated, in general the walker is used for 2 weeks.    PAIN, SWELLING & BRUISING  Some pain, stiffness and swelling is normal for up to 1 year after surgery.  Pain will start to let up over time depending on your activity level.  It is preferable to rest for 24 hours following your surgery  Pain is often delayed for 24-48 hours after surgery.  Pain may be dull/achy, throbbing, or even sharp/nerve sensations  It is normal for swelling and bruising to worsen before it gets better.  These symptoms usually peak 1 week after surgery  Swelling and bruising may appear throughout the leg, all the way down to your toes  Wear your compression stockings every day during the day for 14 days and remove them at night.  This will help control swelling    WOUND CARE INSTRUCTIONS  Your surgical bandage will be removed 2 weeks after surgery at your post-operative visit.  If your bandage becomes compromised, begins to come off before then, or soaks through with drainage call the office immediately.  You may have sutures under the skin that dissolve on their own over time.    As the sutures absorb occasionally a small suture abscess can develop, this is not uncommon for up to 6 weeks, if this occurs please notify your  surgeon immediately.    HYGIENE  You may shower 48 hours after your surgery, provided the Mepilex silver dressing is in place.  No tub bathing or submerging underwater.  Do not scrub directly over the surgical bandage  Do not use any creams, lotions or ointments on the surgical leg for 4 weeks after surgery, or until you have been cleared to do so by your surgeon    GENERAL INSTRUCTIONS  Do not drink alcoholic beverages (beer and wine included) for 24 hours following your surgery, or while you are taking narcotic pain medications  Delay making important decisions until you are fully recovered  You cannot swim or submerge in water for at least 6 weeks after surgery, or until you are cleared by your surgeon.  You may start kneeling 3 months after knee replacement surgery, once you have been cleared by your surgeon.  This may not ever feel “normal” or comfortable    HOME DIET  Resume your normal diet after surgery. If you are on a specific type of diet for your condition, resume that instead.    Choose foods that help promote good bowel habits and prevent constipation, such as foods high in fiber.          POSTOPERATIVE MEDICATIONS    Pain medications have been ordered to help manage pain throughout recovery.  While you are using narcotic pain medication, you should be using a stool softener or laxative to prevent constipation.  My preference is parallax powder once or twice a day when taking the narcotic pain medicine  It is important to eat a small meal or snack before taking pain medications to avoid nausea or stomach upset.    MEDICATION REFILLS - [phone]    If you need to request a medication refill, please call the office between 8:30am-4:30pm, Monday through Friday.    Any calls received outside of this timeframe will be handled on the next business day.    Medication requests received on Saturday or Sunday will be handled on Monday.    Please allow 3-5 business days for all medication requests to be  "processed.    RESTARTING HOME MEDICATIONS  You may restart your home medications the following day after your surgery UNLESS you have been given alternate instructions.    Follow the instructions given to you on your hospital discharge instructions for more information regarding your home medications.    ASSISTIVE DEVICE & MOVEMENT  Initially, you will use a walker or crutches to walk for the first 1-2 weeks.  Once your therapist feels you are ready, you will wean to one crutch or cane followed by no assistive device.  It is important to keep moving throughout recovery.  Walk at least 10 steps every hour that you are awake.  Stairs are part of your recovery, the \"good \"leg leads on the way up, the \"bad \"leg leads on the way down to, use the handrails and not the walker or crutches.  You should be up and walking around several times per day as well as bending your knee and making sure your knee is going completely straight (for knee replacements).  For anterior hip replacements avoid straight leg raise.    NUMBNESS/CLICKING    Decreased sensation or numbness is common on or around the area of the incision due to sensory nerves that are affected at the time of surgery.  The area of numbness will be lateral to the incision  You might always have numbness but the size of the area of numbness should decrease with time.  It is common for patients to have a “click” in the knee with movement.  This is usually nothing to be concerned about.  There are several reasons why your knee can be making these noises, including the implant components rubbing against each other, or a tendons going over a bony prominence.  Grinding can also occur and is not out of the ordinary.  Grinding can be caused by scar tissue formation  Noises will often settle over time once muscle strength improves.    DIFFICULTY SLEEPING    It is very common for patients to have difficulty sleeping at night which can be caused pain, medication, or feeling of " anxiety.  Sleep disturbance typically worsens 4-6 weeks after surgery.  You are permitted to sleep on your back or side with a  pillow between your legs for comfort            DRIVING & TRAVEL  Your surgeon will address this at your post-op appointment.  You must not be taking narcotic pain medication to be cleared to drive. (Usually at least 2 weeks for RIGHT limb and 3 weeks for LEFT limb before driving is permitted)  LEFT LEG JOINT REPLACMENT:  You may drive once you have regained full control of your leg, typically around 2 week after surgery  RIGHT LEG JOINT REPLACEMENT:  You must speak with your surgeon before you resume driving.  Driving can typically be resumed by 4-6 weeks after surgery.  During long distance travel, you should attempt to change position or stand every hour.  You should complete ankle pumps throughout your travel if you are sitting for long periods of time.  If traveling within the first 2 weeks after surgery, you should wear your compression stockings.    DENTAL & OTHER PROCEDURES    All patients must wait a minimum of 3 months for elective procedures, including routine dental cleanings.  For any dental appointment - cleaning or dental procedures - patients must take a prophylactic antibiotic 1 hour before the appointment.  You will also need to call for an antibiotic prior to any other invasive test, procedure, or surgery.  These prophylactic antibiotics will be needed for the rest of your life, in order to prevent infections.  Please call your surgeons office at [phone] to request the antibiotic.      PHYSICAL THERAPY    Following surgery it is important to progress through recovery with in-home or outpatient physical therapy.  You should continue to complete home exercises provided from the hospital on days that you are not working with a physical therapist.    It is common to have a temporary increase in pain and swelling upon starting outpatient physical therapy and/or changing  your exercise routine.  Continue to use ice to help with symptoms.     FOLLOW-UP APPOINTMENT - [phone]    Your post-surgical appointments will take place approximately 2 weeks, 6 weeks, 3 months and 1 year following surgery.  Joint replacements are monitored thereafter every 2-5 years for life.  If you have any questions or need to make changes to this appointment, please contact the office:    EMERGENCIES & WHEN TO CALL YOUR SURGEON  When to contact our office immediately:  Any falls or injury to the joint replacement  Fever >101.5 for at least 48 hours after surgery or chills.  Excessive bleeding from incision(s). A small amount of drainage is normal and expected.  Signs of infection of incision(s)-excessive drainage that is soaking through your dressing (especially if it is pus-like), redness that is spreading out from the edges of your incision, or increased warmth around the area.  Excruciating pain for which the pain medication, taken as instructed, is not helping.  Severe calf pain.  Go directly to the emergency room or call 911, if you are experiencing chest pain or difficulty breathing.              ICE/COLD THERAPY  Ice is most important during the first 2 weeks after surgery, but should be used for several weeks as needed.  Never place ice, or cold therapy devices directly on the skin.  You should always have a protective layer between your skin and the cold.  You have been prescribed to ice your total joint at a minimum of twice per hour for 20 minutes while awake during the first 6 weeks after surgery if you are using ice packs. This will help with pain control.  If you are using an ice machine, please follow ice machine instructions.  After knee replacement is extremely important to elevate the leg straight on an incline, not flat.    COLD THERAPY MACHINE RECOMMENDATIONS      Cold therapy devices can be used before and after surgery to assist in comfort and help to reduce pain and swelling.  These  devices differ from ice or ice packs whereas the mechanism circulates water through tubing and a pad to provide longer periods of cold therapy to the desired site.  While in the hospital, you can use your cold devices around the clock for optimal comfort.  We recommend using cold therapy after working with therapy or completing exercises on your own.  Once you are discharged home, there is no set schedule in which you must follow while using cold therapy.  Below are a few points to remember when using a cold therapy device:    Read the 's instructions prior to first the use.  Follow instructions for filling the cooler (water first, then ice).  Always make sure there is a layer of protection between the cold pad and your skin (Clothing, Towel, Ace Bandage, etc.)  Allow the device to circulate cold water throughout the pad prior wrapping the pad around your leg (approximately 10 minutes).  Place the pad on your leg in the desired position to meet your pain management needs and use the wraps provided to secure the pad to your body.  The purpose of this device is to use consistently throughout the day.  You do not need to need to use the 20 on, 20 off method when using an ice machine.  During waking hours, remove the cold pad every 1-2 hours to perform a skin check  Detach the pad from the cooler and ambulate at least once every hour  After removing the pad, allow at least 30 minutes before resuming cold therapy  You may wear the cold therapy device during periods of sleep including overnight    If you wake up during the night, you can check the skin at this time.  You do not need to wake up specifically to perform skin checks.  Empty the cooler and pad when device is not in use.  Follow 's instructions for cleaning your cold therapy device.     DME can assist with problems related to products purchased through the hospital  [phone] - [place]   or   [phone] - [place]    Sample Medication  Schedule  [facility]    Medication Refills - [phone] - Monday through Friday 8:30am-4:30pm  Please allow 3-5 days for medication refill requests to be processed.

## 2024-09-23 ENCOUNTER — ANESTHESIA (OUTPATIENT)
Dept: OPERATING ROOM | Facility: HOSPITAL | Age: 79
End: 2024-09-23
Payer: MEDICARE

## 2024-09-23 ENCOUNTER — HOSPITAL ENCOUNTER (OUTPATIENT)
Facility: HOSPITAL | Age: 79
Discharge: NURSING, REHAB OR LONG-TERM CARE | End: 2024-09-24
Attending: ORTHOPAEDIC SURGERY | Admitting: ORTHOPAEDIC SURGERY
Payer: MEDICARE

## 2024-09-23 ENCOUNTER — HOME HEALTH ADMISSION (OUTPATIENT)
Dept: HOME HEALTH SERVICES | Facility: HOME HEALTH | Age: 79
End: 2024-09-23
Payer: MEDICARE

## 2024-09-23 ENCOUNTER — DOCUMENTATION (OUTPATIENT)
Dept: HOME HEALTH SERVICES | Facility: HOME HEALTH | Age: 79
End: 2024-09-23
Payer: MEDICARE

## 2024-09-23 ENCOUNTER — APPOINTMENT (OUTPATIENT)
Dept: RADIOLOGY | Facility: HOSPITAL | Age: 79
End: 2024-09-23
Payer: MEDICARE

## 2024-09-23 DIAGNOSIS — M17.11 PRIMARY OSTEOARTHRITIS OF RIGHT KNEE: Primary | ICD-10-CM

## 2024-09-23 PROCEDURE — 2500000001 HC RX 250 WO HCPCS SELF ADMINISTERED DRUGS (ALT 637 FOR MEDICARE OP): Performed by: NURSE PRACTITIONER

## 2024-09-23 PROCEDURE — 2500000004 HC RX 250 GENERAL PHARMACY W/ HCPCS (ALT 636 FOR OP/ED): Performed by: ORTHOPAEDIC SURGERY

## 2024-09-23 PROCEDURE — 97110 THERAPEUTIC EXERCISES: CPT | Mod: KX,GP

## 2024-09-23 PROCEDURE — 99100 ANES PT EXTEME AGE<1 YR&>70: CPT | Performed by: ANESTHESIOLOGY

## 2024-09-23 PROCEDURE — 2720000007 HC OR 272 NO HCPCS: Performed by: ORTHOPAEDIC SURGERY

## 2024-09-23 PROCEDURE — 99222 1ST HOSP IP/OBS MODERATE 55: CPT | Performed by: INTERNAL MEDICINE

## 2024-09-23 PROCEDURE — 73560 X-RAY EXAM OF KNEE 1 OR 2: CPT | Mod: RIGHT SIDE | Performed by: RADIOLOGY

## 2024-09-23 PROCEDURE — 97161 PT EVAL LOW COMPLEX 20 MIN: CPT | Mod: KX,GP

## 2024-09-23 PROCEDURE — 3600000018 HC OR TIME - INITIAL BASE CHARGE - PROCEDURE LEVEL SIX: Performed by: ORTHOPAEDIC SURGERY

## 2024-09-23 PROCEDURE — 2780000003 HC OR 278 NO HCPCS: Performed by: ORTHOPAEDIC SURGERY

## 2024-09-23 PROCEDURE — 7100000002 HC RECOVERY ROOM TIME - EACH INCREMENTAL 1 MINUTE: Performed by: ORTHOPAEDIC SURGERY

## 2024-09-23 PROCEDURE — 7100000001 HC RECOVERY ROOM TIME - INITIAL BASE CHARGE: Performed by: ORTHOPAEDIC SURGERY

## 2024-09-23 PROCEDURE — 2500000004 HC RX 250 GENERAL PHARMACY W/ HCPCS (ALT 636 FOR OP/ED): Performed by: ANESTHESIOLOGY

## 2024-09-23 PROCEDURE — 2500000004 HC RX 250 GENERAL PHARMACY W/ HCPCS (ALT 636 FOR OP/ED): Mod: JZ | Performed by: NURSE PRACTITIONER

## 2024-09-23 PROCEDURE — 3700000002 HC GENERAL ANESTHESIA TIME - EACH INCREMENTAL 1 MINUTE: Performed by: ORTHOPAEDIC SURGERY

## 2024-09-23 PROCEDURE — A27447 PR TOTAL KNEE ARTHROPLASTY: Performed by: ANESTHESIOLOGY

## 2024-09-23 PROCEDURE — 7100000011 HC EXTENDED STAY RECOVERY HOURLY - NURSING UNIT

## 2024-09-23 PROCEDURE — 2500000004 HC RX 250 GENERAL PHARMACY W/ HCPCS (ALT 636 FOR OP/ED): Performed by: NURSE ANESTHETIST, CERTIFIED REGISTERED

## 2024-09-23 PROCEDURE — A6213 FOAM DRG >16<=48 SQ IN W/BDR: HCPCS | Performed by: ORTHOPAEDIC SURGERY

## 2024-09-23 PROCEDURE — C1776 JOINT DEVICE (IMPLANTABLE): HCPCS | Performed by: ORTHOPAEDIC SURGERY

## 2024-09-23 PROCEDURE — 2500000005 HC RX 250 GENERAL PHARMACY W/O HCPCS: Performed by: NURSE PRACTITIONER

## 2024-09-23 PROCEDURE — C1713 ANCHOR/SCREW BN/BN,TIS/BN: HCPCS | Performed by: ORTHOPAEDIC SURGERY

## 2024-09-23 PROCEDURE — A27447 PR TOTAL KNEE ARTHROPLASTY: Performed by: NURSE ANESTHETIST, CERTIFIED REGISTERED

## 2024-09-23 PROCEDURE — 94760 N-INVAS EAR/PLS OXIMETRY 1: CPT | Mod: 59

## 2024-09-23 PROCEDURE — 3700000001 HC GENERAL ANESTHESIA TIME - INITIAL BASE CHARGE: Performed by: ORTHOPAEDIC SURGERY

## 2024-09-23 PROCEDURE — 73560 X-RAY EXAM OF KNEE 1 OR 2: CPT | Mod: RT

## 2024-09-23 PROCEDURE — 2500000005 HC RX 250 GENERAL PHARMACY W/O HCPCS: Performed by: ORTHOPAEDIC SURGERY

## 2024-09-23 PROCEDURE — 9420000001 HC RT PATIENT EDUCATION 5 MIN

## 2024-09-23 PROCEDURE — 2500000005 HC RX 250 GENERAL PHARMACY W/O HCPCS: Performed by: ANESTHESIOLOGY

## 2024-09-23 PROCEDURE — 3600000017 HC OR TIME - EACH INCREMENTAL 1 MINUTE - PROCEDURE LEVEL SIX: Performed by: ORTHOPAEDIC SURGERY

## 2024-09-23 PROCEDURE — A9999 DME SUPPLY OR ACCESSORY, NOS: HCPCS | Performed by: ORTHOPAEDIC SURGERY

## 2024-09-23 DEVICE — BONE CEMENT, SMART SET, HIGH VISCOSITY, 40GM: Type: IMPLANTABLE DEVICE | Site: KNEE | Status: FUNCTIONAL

## 2024-09-23 DEVICE — P.F.C. SIGMA OVAL DOME PATELLA 3-PEG 38MM CEMENTED
Type: IMPLANTABLE DEVICE | Site: KNEE | Status: FUNCTIONAL
Brand: P.F.C. SIGMA

## 2024-09-23 DEVICE — LCS SIGMA TIBIAL TRAY ROTATING PLATFORM MBT KEEL SIZE 3 CEMENTED
Type: IMPLANTABLE DEVICE | Site: KNEE | Status: FUNCTIONAL
Brand: LCS SIGMA

## 2024-09-23 DEVICE — SIGMA FEMORAL POSTERIOR STABILIZED CEMENTED SIZE 4 RIGHT
Type: IMPLANTABLE DEVICE | Site: KNEE | Status: FUNCTIONAL
Brand: SIGMA

## 2024-09-23 DEVICE — P.F.C. SIGMA TIBIAL INSERT ROTATING PLATFORM STABILIZED 4 (STAB) 12.5MM GVF
Type: IMPLANTABLE DEVICE | Site: KNEE | Status: FUNCTIONAL
Brand: P.F.C. SIGMA

## 2024-09-23 RX ORDER — DOCUSATE SODIUM 100 MG/1
100 CAPSULE, LIQUID FILLED ORAL 2 TIMES DAILY
Status: DISCONTINUED | OUTPATIENT
Start: 2024-09-23 | End: 2024-09-24 | Stop reason: HOSPADM

## 2024-09-23 RX ORDER — CEFAZOLIN 1 G/1
INJECTION, POWDER, FOR SOLUTION INTRAVENOUS AS NEEDED
Status: DISCONTINUED | OUTPATIENT
Start: 2024-09-23 | End: 2024-09-23

## 2024-09-23 RX ORDER — CEFAZOLIN SODIUM 2 G/100ML
2 INJECTION, SOLUTION INTRAVENOUS EVERY 8 HOURS
Status: DISCONTINUED | OUTPATIENT
Start: 2024-09-23 | End: 2024-09-23

## 2024-09-23 RX ORDER — SODIUM CHLORIDE 0.9 G/100ML
IRRIGANT IRRIGATION AS NEEDED
Status: DISCONTINUED | OUTPATIENT
Start: 2024-09-23 | End: 2024-09-23 | Stop reason: HOSPADM

## 2024-09-23 RX ORDER — POLYETHYLENE GLYCOL 3350 17 G/17G
17 POWDER, FOR SOLUTION ORAL DAILY
Status: DISCONTINUED | OUTPATIENT
Start: 2024-09-23 | End: 2024-09-24 | Stop reason: HOSPADM

## 2024-09-23 RX ORDER — OXYCODONE HYDROCHLORIDE 5 MG/1
5 TABLET ORAL EVERY 4 HOURS PRN
Status: DISCONTINUED | OUTPATIENT
Start: 2024-09-23 | End: 2024-09-23 | Stop reason: HOSPADM

## 2024-09-23 RX ORDER — SODIUM CHLORIDE, SODIUM LACTATE, POTASSIUM CHLORIDE, CALCIUM CHLORIDE 600; 310; 30; 20 MG/100ML; MG/100ML; MG/100ML; MG/100ML
20 INJECTION, SOLUTION INTRAVENOUS CONTINUOUS
Status: DISCONTINUED | OUTPATIENT
Start: 2024-09-23 | End: 2024-09-23

## 2024-09-23 RX ORDER — NALOXONE HYDROCHLORIDE 0.4 MG/ML
0.2 INJECTION, SOLUTION INTRAMUSCULAR; INTRAVENOUS; SUBCUTANEOUS EVERY 5 MIN PRN
Status: DISCONTINUED | OUTPATIENT
Start: 2024-09-23 | End: 2024-09-24 | Stop reason: HOSPADM

## 2024-09-23 RX ORDER — BUPIVACAINE HYDROCHLORIDE 7.5 MG/ML
INJECTION, SOLUTION INTRASPINAL AS NEEDED
Status: DISCONTINUED | OUTPATIENT
Start: 2024-09-23 | End: 2024-09-23

## 2024-09-23 RX ORDER — ALBUTEROL SULFATE 0.83 MG/ML
2.5 SOLUTION RESPIRATORY (INHALATION) ONCE AS NEEDED
Status: DISCONTINUED | OUTPATIENT
Start: 2024-09-23 | End: 2024-09-23 | Stop reason: HOSPADM

## 2024-09-23 RX ORDER — ASPIRIN 325 MG
325 TABLET, DELAYED RELEASE (ENTERIC COATED) ORAL DAILY
Status: DISCONTINUED | OUTPATIENT
Start: 2024-09-24 | End: 2024-09-24 | Stop reason: HOSPADM

## 2024-09-23 RX ORDER — ACETAMINOPHEN 325 MG/1
975 TABLET ORAL ONCE
Status: COMPLETED | OUTPATIENT
Start: 2024-09-23 | End: 2024-09-23

## 2024-09-23 RX ORDER — CEFAZOLIN SODIUM 2 G/100ML
2 INJECTION, SOLUTION INTRAVENOUS EVERY 6 HOURS
Status: DISCONTINUED | OUTPATIENT
Start: 2024-09-23 | End: 2024-09-24 | Stop reason: HOSPADM

## 2024-09-23 RX ORDER — HYDROCODONE BITARTRATE AND ACETAMINOPHEN 5; 325 MG/1; MG/1
2 TABLET ORAL EVERY 6 HOURS PRN
Status: DISCONTINUED | OUTPATIENT
Start: 2024-09-23 | End: 2024-09-24 | Stop reason: HOSPADM

## 2024-09-23 RX ORDER — ONDANSETRON 4 MG/1
4 TABLET, ORALLY DISINTEGRATING ORAL EVERY 8 HOURS PRN
Status: DISCONTINUED | OUTPATIENT
Start: 2024-09-23 | End: 2024-09-24 | Stop reason: HOSPADM

## 2024-09-23 RX ORDER — ONDANSETRON HYDROCHLORIDE 2 MG/ML
4 INJECTION, SOLUTION INTRAVENOUS ONCE AS NEEDED
Status: DISCONTINUED | OUTPATIENT
Start: 2024-09-23 | End: 2024-09-23 | Stop reason: HOSPADM

## 2024-09-23 RX ORDER — HYDROCODONE BITARTRATE AND ACETAMINOPHEN 5; 325 MG/1; MG/1
1 TABLET ORAL EVERY 6 HOURS PRN
Status: DISCONTINUED | OUTPATIENT
Start: 2024-09-23 | End: 2024-09-24 | Stop reason: HOSPADM

## 2024-09-23 RX ORDER — ONDANSETRON HYDROCHLORIDE 2 MG/ML
4 INJECTION, SOLUTION INTRAVENOUS EVERY 8 HOURS PRN
Status: DISCONTINUED | OUTPATIENT
Start: 2024-09-23 | End: 2024-09-24 | Stop reason: HOSPADM

## 2024-09-23 RX ORDER — MEPERIDINE HYDROCHLORIDE 25 MG/ML
12.5 INJECTION INTRAMUSCULAR; INTRAVENOUS; SUBCUTANEOUS EVERY 10 MIN PRN
Status: DISCONTINUED | OUTPATIENT
Start: 2024-09-23 | End: 2024-09-23 | Stop reason: HOSPADM

## 2024-09-23 RX ORDER — MIDAZOLAM HYDROCHLORIDE 1 MG/ML
INJECTION INTRAMUSCULAR; INTRAVENOUS AS NEEDED
Status: DISCONTINUED | OUTPATIENT
Start: 2024-09-23 | End: 2024-09-23

## 2024-09-23 RX ORDER — FENTANYL CITRATE 50 UG/ML
INJECTION, SOLUTION INTRAMUSCULAR; INTRAVENOUS AS NEEDED
Status: DISCONTINUED | OUTPATIENT
Start: 2024-09-23 | End: 2024-09-23

## 2024-09-23 RX ORDER — TRANEXAMIC ACID 10 MG/ML
INJECTION, SOLUTION INTRAVENOUS AS NEEDED
Status: DISCONTINUED | OUTPATIENT
Start: 2024-09-23 | End: 2024-09-23

## 2024-09-23 RX ORDER — DIPHENHYDRAMINE HYDROCHLORIDE 50 MG/ML
INJECTION INTRAMUSCULAR; INTRAVENOUS AS NEEDED
Status: DISCONTINUED | OUTPATIENT
Start: 2024-09-23 | End: 2024-09-23

## 2024-09-23 RX ORDER — PROPOFOL 10 MG/ML
INJECTION, EMULSION INTRAVENOUS CONTINUOUS PRN
Status: DISCONTINUED | OUTPATIENT
Start: 2024-09-23 | End: 2024-09-23

## 2024-09-23 RX ORDER — DIPHENHYDRAMINE HYDROCHLORIDE 50 MG/ML
12.5 INJECTION INTRAMUSCULAR; INTRAVENOUS ONCE AS NEEDED
Status: DISCONTINUED | OUTPATIENT
Start: 2024-09-23 | End: 2024-09-23 | Stop reason: HOSPADM

## 2024-09-23 RX ORDER — SODIUM CHLORIDE, SODIUM LACTATE, POTASSIUM CHLORIDE, CALCIUM CHLORIDE 600; 310; 30; 20 MG/100ML; MG/100ML; MG/100ML; MG/100ML
100 INJECTION, SOLUTION INTRAVENOUS CONTINUOUS
Status: DISCONTINUED | OUTPATIENT
Start: 2024-09-23 | End: 2024-09-23 | Stop reason: HOSPADM

## 2024-09-23 RX ORDER — ONDANSETRON HYDROCHLORIDE 2 MG/ML
INJECTION, SOLUTION INTRAVENOUS AS NEEDED
Status: DISCONTINUED | OUTPATIENT
Start: 2024-09-23 | End: 2024-09-23

## 2024-09-23 RX ORDER — DROPERIDOL 2.5 MG/ML
0.62 INJECTION, SOLUTION INTRAMUSCULAR; INTRAVENOUS ONCE AS NEEDED
Status: DISCONTINUED | OUTPATIENT
Start: 2024-09-23 | End: 2024-09-23 | Stop reason: HOSPADM

## 2024-09-23 RX ORDER — TRANEXAMIC ACID 100 MG/ML
1000 INJECTION, SOLUTION INTRAVENOUS ONCE
Status: DISCONTINUED | OUTPATIENT
Start: 2024-09-23 | End: 2024-09-23 | Stop reason: HOSPADM

## 2024-09-23 RX ORDER — SODIUM CHLORIDE, SODIUM LACTATE, POTASSIUM CHLORIDE, CALCIUM CHLORIDE 600; 310; 30; 20 MG/100ML; MG/100ML; MG/100ML; MG/100ML
75 INJECTION, SOLUTION INTRAVENOUS CONTINUOUS
Status: DISCONTINUED | OUTPATIENT
Start: 2024-09-23 | End: 2024-09-24 | Stop reason: HOSPADM

## 2024-09-23 RX ORDER — CEFAZOLIN SODIUM 2 G/100ML
2 INJECTION, SOLUTION INTRAVENOUS ONCE
Status: DISCONTINUED | OUTPATIENT
Start: 2024-09-23 | End: 2024-09-23 | Stop reason: HOSPADM

## 2024-09-23 RX ORDER — MIDAZOLAM HYDROCHLORIDE 1 MG/ML
INJECTION, SOLUTION INTRAMUSCULAR; INTRAVENOUS AS NEEDED
Status: DISCONTINUED | OUTPATIENT
Start: 2024-09-23 | End: 2024-09-23

## 2024-09-23 SDOH — SOCIAL STABILITY: SOCIAL INSECURITY: HAVE YOU HAD THOUGHTS OF HARMING ANYONE ELSE?: NO

## 2024-09-23 SDOH — SOCIAL STABILITY: SOCIAL INSECURITY: DO YOU FEEL ANYONE HAS EXPLOITED OR TAKEN ADVANTAGE OF YOU FINANCIALLY OR OF YOUR PERSONAL PROPERTY?: NO

## 2024-09-23 SDOH — HEALTH STABILITY: MENTAL HEALTH: CURRENT SMOKER: 0

## 2024-09-23 SDOH — SOCIAL STABILITY: SOCIAL INSECURITY: ABUSE: ADULT

## 2024-09-23 SDOH — SOCIAL STABILITY: SOCIAL INSECURITY: DO YOU FEEL UNSAFE GOING BACK TO THE PLACE WHERE YOU ARE LIVING?: NO

## 2024-09-23 SDOH — SOCIAL STABILITY: SOCIAL INSECURITY: ARE THERE ANY APPARENT SIGNS OF INJURIES/BEHAVIORS THAT COULD BE RELATED TO ABUSE/NEGLECT?: NO

## 2024-09-23 SDOH — SOCIAL STABILITY: SOCIAL INSECURITY: ARE YOU OR HAVE YOU BEEN THREATENED OR ABUSED PHYSICALLY, EMOTIONALLY, OR SEXUALLY BY ANYONE?: NO

## 2024-09-23 SDOH — SOCIAL STABILITY: SOCIAL INSECURITY: DOES ANYONE TRY TO KEEP YOU FROM HAVING/CONTACTING OTHER FRIENDS OR DOING THINGS OUTSIDE YOUR HOME?: NO

## 2024-09-23 SDOH — SOCIAL STABILITY: SOCIAL INSECURITY: HAS ANYONE EVER THREATENED TO HURT YOUR FAMILY OR YOUR PETS?: NO

## 2024-09-23 SDOH — SOCIAL STABILITY: SOCIAL INSECURITY: WERE YOU ABLE TO COMPLETE ALL THE BEHAVIORAL HEALTH SCREENINGS?: YES

## 2024-09-23 ASSESSMENT — COGNITIVE AND FUNCTIONAL STATUS - GENERAL
CLIMB 3 TO 5 STEPS WITH RAILING: A LITTLE
STANDING UP FROM CHAIR USING ARMS: A LITTLE
TURNING FROM BACK TO SIDE WHILE IN FLAT BAD: A LOT
STANDING UP FROM CHAIR USING ARMS: A LITTLE
DRESSING REGULAR LOWER BODY CLOTHING: A LITTLE
CLIMB 3 TO 5 STEPS WITH RAILING: A LOT
MOVING FROM LYING ON BACK TO SITTING ON SIDE OF FLAT BED WITH BEDRAILS: A LITTLE
DAILY ACTIVITIY SCORE: 21
MOVING TO AND FROM BED TO CHAIR: A LITTLE
TOILETING: A LITTLE
TURNING FROM BACK TO SIDE WHILE IN FLAT BAD: A LITTLE
MOBILITY SCORE: 15
PATIENT BASELINE BEDBOUND: NO
MOVING TO AND FROM BED TO CHAIR: A LITTLE
HELP NEEDED FOR BATHING: A LITTLE
CLIMB 3 TO 5 STEPS WITH RAILING: A LITTLE
MOVING FROM LYING ON BACK TO SITTING ON SIDE OF FLAT BED WITH BEDRAILS: A LOT
DRESSING REGULAR UPPER BODY CLOTHING: A LITTLE
WALKING IN HOSPITAL ROOM: A LITTLE
WALKING IN HOSPITAL ROOM: A LITTLE
DRESSING REGULAR LOWER BODY CLOTHING: A LOT
WALKING IN HOSPITAL ROOM: A LITTLE
STANDING UP FROM CHAIR USING ARMS: A LITTLE
MOBILITY SCORE: 18
HELP NEEDED FOR BATHING: A LITTLE
DAILY ACTIVITIY SCORE: 20
MOBILITY SCORE: 21

## 2024-09-23 ASSESSMENT — COLUMBIA-SUICIDE SEVERITY RATING SCALE - C-SSRS
6. HAVE YOU EVER DONE ANYTHING, STARTED TO DO ANYTHING, OR PREPARED TO DO ANYTHING TO END YOUR LIFE?: NO
2. HAVE YOU ACTUALLY HAD ANY THOUGHTS OF KILLING YOURSELF?: NO
1. IN THE PAST MONTH, HAVE YOU WISHED YOU WERE DEAD OR WISHED YOU COULD GO TO SLEEP AND NOT WAKE UP?: NO

## 2024-09-23 ASSESSMENT — LIFESTYLE VARIABLES
SKIP TO QUESTIONS 9-10: 1
HOW MANY STANDARD DRINKS CONTAINING ALCOHOL DO YOU HAVE ON A TYPICAL DAY: PATIENT DOES NOT DRINK
HOW OFTEN DO YOU HAVE A DRINK CONTAINING ALCOHOL: NEVER
HOW OFTEN DO YOU HAVE 6 OR MORE DRINKS ON ONE OCCASION: NEVER
AUDIT-C TOTAL SCORE: 0
AUDIT-C TOTAL SCORE: 0

## 2024-09-23 ASSESSMENT — PATIENT HEALTH QUESTIONNAIRE - PHQ9
2. FEELING DOWN, DEPRESSED OR HOPELESS: NOT AT ALL
SUM OF ALL RESPONSES TO PHQ9 QUESTIONS 1 & 2: 0
1. LITTLE INTEREST OR PLEASURE IN DOING THINGS: NOT AT ALL

## 2024-09-23 ASSESSMENT — ACTIVITIES OF DAILY LIVING (ADL)
GROOMING: INDEPENDENT
PATIENT'S MEMORY ADEQUATE TO SAFELY COMPLETE DAILY ACTIVITIES?: YES
LACK_OF_TRANSPORTATION: NO
ADEQUATE_TO_COMPLETE_ADL: YES
HEARING - RIGHT EAR: FUNCTIONAL
BATHING: NEEDS ASSISTANCE
DRESSING YOURSELF: NEEDS ASSISTANCE
HEARING - LEFT EAR: FUNCTIONAL
WALKS IN HOME: INDEPENDENT
TOILETING: NEEDS ASSISTANCE
ASSISTIVE_DEVICE: WALKER
FEEDING YOURSELF: INDEPENDENT
JUDGMENT_ADEQUATE_SAFELY_COMPLETE_DAILY_ACTIVITIES: YES

## 2024-09-23 ASSESSMENT — PAIN SCALES - GENERAL
PAINLEVEL_OUTOF10: 0 - NO PAIN
PAINLEVEL_OUTOF10: 5 - MODERATE PAIN
PAINLEVEL_OUTOF10: 0 - NO PAIN
PAINLEVEL_OUTOF10: 4
PAINLEVEL_OUTOF10: 6
PAINLEVEL_OUTOF10: 0 - NO PAIN
PAINLEVEL_OUTOF10: 0 - NO PAIN
PAINLEVEL_OUTOF10: 3
PAINLEVEL_OUTOF10: 1

## 2024-09-23 ASSESSMENT — PAIN DESCRIPTION - ORIENTATION
ORIENTATION: RIGHT
ORIENTATION: RIGHT

## 2024-09-23 ASSESSMENT — PAIN - FUNCTIONAL ASSESSMENT
PAIN_FUNCTIONAL_ASSESSMENT: 0-10

## 2024-09-23 ASSESSMENT — ENCOUNTER SYMPTOMS
NEUROLOGICAL NEGATIVE: 1
CONSTITUTIONAL NEGATIVE: 1
GASTROINTESTINAL NEGATIVE: 1
RESPIRATORY NEGATIVE: 1
CARDIOVASCULAR NEGATIVE: 1
EYES NEGATIVE: 1
PSYCHIATRIC NEGATIVE: 1
HEMATOLOGIC/LYMPHATIC NEGATIVE: 1
ARTHRALGIAS: 1

## 2024-09-23 ASSESSMENT — PAIN DESCRIPTION - LOCATION
LOCATION: KNEE
LOCATION: KNEE

## 2024-09-23 NOTE — ANESTHESIA PROCEDURE NOTES
Spinal Block    Patient location during procedure: OR  Start time: 9/23/2024 8:54 AM  End time: 9/23/2024 8:58 AM  Reason for block: primary anesthetic and at surgeon's request  Staffing  Performed: CRNA   Authorized by: Rodriguez Ibarra MD    Performed by: ANDRAE Hernandez-CRNA    Preanesthetic Checklist  Completed: patient identified, IV checked, site marked, risks and benefits discussed, surgical consent, monitors and equipment checked, pre-op evaluation, timeout performed and sterile techniques followed  Block Timeout  RN/Licensed healthcare professional reads aloud to the Anesthesia provider and entire team: Patient identity, procedure with side and site, patient position, and as applicable the availability of implants/special equipment/special requirements.  Patient on coagulant treatment: no  Timeout performed at: 9/23/2024 8:45 AM  Spinal Block  Patient position: sitting  Prep: Betadine  Sterility prep: cap, drape, gloves, gown, hand hygiene and mask  Sedation level: moderate sedation  Patient monitoring: continuous pulse oximetry  Approach: midline  Vertebral space: L3-4  Injection technique: single-shot  Needle  Needle type: pencil-point   Needle gauge: 24 G  Needle length: 4 in  Free flowing CSF: yes    Assessment  Sensory level: T10  Block outcome: patient comfortable  Procedure assessment: patient sedated but conversant throughout procedure and patient tolerated procedure well with no immediate complications

## 2024-09-23 NOTE — CARE PLAN
Spoke to Mr. Goodwin and Mrs. Goodwin at the bedside. Spoke about [place] and if that was still their preference. They would like to discharge to [place] when medically ready. Mr Goodwin has payment to [place] in hand and will drop off tomorrow when she is discharged. Mr Goodwin would like to transport patient to [place] via private car. Spoke with NP about a bowel regimen upon discharge to the SNF.

## 2024-09-23 NOTE — PROGRESS NOTES
09/23/24 1253   Discharge Planning   Expected Discharge Disposition   (Referral sent to [facility]. Patient will be going private pay)

## 2024-09-23 NOTE — ANESTHESIA PROCEDURE NOTES
Peripheral Block    Patient location during procedure: pre-op  Reason for block: at surgeon's request and post-op pain management  Staffing  Performed: attending   Authorized by: Rodriguez Ibarra MD    Performed by: Rodriguez Ibarra MD  Preanesthetic Checklist  Completed: patient identified, IV checked, site marked, risks and benefits discussed, surgical consent, monitors and equipment checked, pre-op evaluation and timeout performed   Timeout performed at:   Peripheral Block  Patient position: laying flat  Prep: ChloraPrep and site prepped and draped  Patient monitoring: heart rate and continuous pulse ox  Block type: adductor canal  Laterality: right  Injection technique: single-shot  Needle  Needle type: short-bevel   Needle gauge: 22 G  Needle length: 8 cm  Needle localization: anatomical landmarks, ultrasound guidance and nerve stimulator  Assessment  Injection assessment: negative aspiration for heme, no paresthesia on injection, incremental injection and local visualized surrounding nerve on ultrasound  Paresthesia pain: none  Heart rate change: no  Slow fractionated injection: yes  Additional Notes  30mL 0.5% Marcaine 1:300k epi 5mg decadron

## 2024-09-23 NOTE — PROGRESS NOTES
09/23/24 1252   Discharge Planning   Living Arrangements Spouse/significant other   Support Systems Spouse/significant other   Assistance Needed Patient at baseline is A&OX3, Independent, drives and does not use any assistive devices.   Type of Residence Private residence   Who is requesting discharge planning? Patient   Home or Post Acute Services Community services;Post acute facilities (Rehab/SNF/etc)   Type of Post Acute Facility Services Skilled nursing   Expected Discharge Disposition SNF  ([facility])   Does the patient need discharge transport arranged? No   Patient Choice   Provider Choice list and CMS website (https://medicare.gov/care-compare#search) for post-acute Quality and Resource Measure Data were provided and reviewed with: Family;Patient   Patient / Family choosing to utilize agency / facility established prior to hospitalization No

## 2024-09-23 NOTE — ANESTHESIA POSTPROCEDURE EVALUATION
Patient: Mariaelena Goodwin    Procedure Summary       Date: 09/23/24 Room / Location: [facility] OR 04 / Virtual [facility] OR    Anesthesia Start: 0845 Anesthesia Stop: 1031    Procedure: OPEN TOTAL KNEE ARTHROPLASTY (Right: Knee) Diagnosis:       Primary osteoarthritis of right knee      (Primary osteoarthritis of right knee [M17.11])    Surgeons: Lefty Arambula DO Responsible Provider: Rodriguez Ibarra MD    Anesthesia Type: spinal, regional ASA Status: 3            Anesthesia Type: spinal, regional    Vitals Value Taken Time   /55 09/23/24 1032   Temp 36.3 °C (97.3 °F) 09/23/24 1032   Pulse 98 09/23/24 1032   Resp 18 09/23/24 1032   SpO2 98 % 09/23/24 1032       Anesthesia Post Evaluation    Patient location during evaluation: PACU  Patient participation: complete - patient participated  Level of consciousness: awake  Pain management: adequate  Multimodal analgesia pain management approach  Airway patency: patent  Two or more strategies used to mitigate risk of obstructive sleep apnea  Cardiovascular status: acceptable  Respiratory status: acceptable  Hydration status: acceptable  Postoperative Nausea and Vomiting: none        No notable events documented.

## 2024-09-23 NOTE — OP NOTE
RIGHT TOTAL KNEE ARTHROPLASTY     Date: 2024   OR Location: [facility]    Name: Mariaelena Goodwin  : 1945    MRN: [id number]    Diagnosis  Pre-op Diagnosis      * Primary osteoarthritis of right knee [M17.11]  Post-op Diagnosis     * Primary osteoarthritis of right knee [M17.11]      Procedures  OPEN TOTAL KNEE ARTHROPLASTY  92420 - MS ARTHRP KNE CONDYLE&PLATU MEDIAL&LAT COMPARTMENTS      Surgeons      * Lefty Arambula - Primary     Specimen: none.    Staff: Circulator: Jessi Reyes RN  Scrub Person: Patience Zayas     Drains and/or Catheters: none    Estimated Blood Loss: 50 cc    Resident/Fellow/Other Assistant:  Surgeons and Role:  * No surgeons found with a matching role *     Procedure Summary  Anesthesia: Consult    Anesthesia Staff: Anesthesiologist: Rodriguez Ibarra MD  CRNA: ANDRAE Hernandez-CRNA   ASA: III       Intra-op Medications:   - 1 Gram Tranexamic acid prior to surgical incision  - 1 Gram Tranexamic acid at start of incision close  - HO Pain cockail: ropivacaine-epinephrine-clonidine-ketorolac 2.46-0.005- 0.0008-0.3mg/mL periarticular syringe: 50 cc    IMPLANTS:   Implant Name Type Inv. Item Serial No.  Lot No. LRB No. Used Action   BONE CEMENT, SMART SET, HIGH VISCOSITY, 40GM - [id number] Joint Knee BONE CEMENT, SMART SET, HIGH VISCOSITY, 40GM  DEPUY [id number] Right 1 Implanted   FEMORAL, CEMENTED, POSTERIOR STABILIZED, RT SZ 4 - [id number] Joint FEMORAL, CEMENTED, POSTERIOR STABILIZED, RT SZ 4  DEPUY [id number] Right 1 Implanted   INSERT, TIBIA PFC, RP-STAB 4, 12.5MM - [id number] Joint INSERT, TIBIA PFC, RP-STAB 4, 12.5MM  DEPUY [id number] Right 1 Implanted   TIBIA TRAY, JORJE KEEL 3 - [id number] Joint TIBIA TRAY, JORJE KEEL 3  DEPUY [id number] Right 1 Implanted   PATELLA, OVAL DOME 3-PEG 38MM MED - [id number] Joint PATELLA, OVAL DOME 3-PEG 38MM MED  DEPUY [id number] Right 1 Implanted       Findings: See operative note    Operative Indications:  Mariaelena Goodwin is a patient that  is well-known to me and initially presented as an outpatient. They have been treated for advanced knee osteoarthritis with therapy, anti-inflammatories, and activity modification, all without improvement of symptoms. They are here for surgery for Pre-op Diagnosis      * Primary osteoarthritis of right knee [M17.11].     We therefore reviewed the alternative option of elective total knee arthroplasty. The risks and benefits of this procedure were discussed in detail as an outpatient and are documented in our outpatient record. The patient expressed full understanding and wished to proceed. Informed consent was obtained and was placed on the medical chart    The risks, benefits and potential complications of the arthroplasty surgery were discussed with the patient in detail.  Risks including but not limited to the risk of anesthesia, DVT, pulmonary embolism with the possibility of death, retained infection, and neurovascular injury.  Specific details of the procedure, hospitalization, recovery, rehabilitation, and long-term precautions were also provided. Pre-operative teaching was provided. Implant/prosthesis selection was outlined, and the many options available were explained; the final choice will be made at the time of the procedure to match the anatomy and condition of the bone, ligaments, tendons, and muscles. Understanding of all topics was conveyed to me by the patient, and consent was given to proceed with a total knee arthroplasty.     On the day of surgery the site of surgery was properly noted/marked if necessary per policy. The patient has been actively warmed in preoperative area. Preoperative antibiotics were confirmed and started prior to surgical incision. Venous thrombosis prophylaxis have been ordered including bilateral sequential compression devices to start in pre-operative area.     At the time of surgery there was found to be severe advanced osteoarthritis in the right knee with varus  deformity.  Bone-on-bone changes were noted in the medial and patellofemoral joints.  Osteophytes were also appreciated.  A 10 degree flexion contracture was noted.    Procedure:     Patient was brought to the operative suite where satisfactory spinal anesthetic was administered by department anesthesia and an adductor canal block.  Patient was placed in supine position on the operative table.  The left lower extremity was sterilely draped and prepped in routine surgical fashion.  A tourniquet was applied in the upper one third of the patient's right thigh.  The right lower was exsanguinated free of blood tourniquet was inflated to 325 mmHg.  Landmarks were identified.  An incision was carried out over the anterior medial aspect of the patient's left knee extending approximately 9 cm in length in curvilinear fashion.  A try vector quad sparing incision was created.  The patella was everted 90 degrees fat pad was excised.  The patella was measured and found to be 23 mm thick.  The arthritic articular cartilage was resected from the patella using a sagittal saw.  A size 38 trial patella was placed.  The height was measured found to be 24 mm thick.  A protective plate was then placed on the cut surface of the patella and the knee was flexed.  Osteophytes removed with rongeur ACL and PCL were sacrificed along the medial lateral meniscus.    A  hole was created into the distal femur for intramedullary instrumentation.  The distal femoral cut was performed.  The femur was sized and found be a size 4.  A size 4 cutting block was pinned to the distal femur and appropriate cuts were carried out using a sagittal saw.  The notch cut was performed using a reciprocating saw.  Attention was then directed to the tibia where a  hole was created for intramedullary instrumentation.  Cutting block assembly was applied over the guide cecilia.  Proximal tibia cut was performed using a sagittal saw the tibia was sized and found to  be a size 3.  Trial implants were inserted using a size 4 femur size 3 tibia 12.5 mm spacer and a 38 patella.  There was found to be excellent range of motion and stability.  Satisfactory tracking of the patellofemoral joint was appreciated.    Trial components were removed the proximal tibia was finished using a drill and broach.  Bony ends were lavaged and dried bone cement was mixed.  A size 3 tibial baseplate was cemented into position a size 4 posterior stabilized femoral component was cemented into position.  A size 38 patella was cemented into position.  Excess bone cement was removed.  A size 12.5 rotating platform polyethylene was placed on the tibial baseplate and the knee was reduced.  The knee was taken through full range of motion found of excellent motion and stability.    The pericapsular tissues and subcutaneous tissues were injected with ropivacaine, saline, Toradol, and epinephrine for postoperative pain control.    The subcutaneous tissues were injected with ropivacaine, Toradol, and saline.  Tourniquet was released good blood flow was noted to return to the patient's right lower extremity.  Layer closure was carried out using a running lock #1 undyed Vicryl for the deep fascia.  Interrupted #2 FiberWire sutures in a barbed #2 suture was also used.  A 2-0 undyed Vicryl for subcutaneous tissues.  A subcuticular closure with 3 oh V-Loc followed by Dermabond Steri-Strips and a Mepilex silver dressing was applied.  MELANIE hose and Ace bandage was applied.  Patient was transferred to the PACU having tolerated the procedure well.                                  Weight Bearing Status:  WBAT Bilateral LE                        Range of Motion: As tolerated                               Dressing: Maintain for one week                        Discharge/Follow-up: Follow up in clinic in two weeks    Lefty Arambula D.O.      This note was dictated using speech recognition software and was not corrected for  spelling or grammatical errors

## 2024-09-23 NOTE — HH CARE COORDINATION
Home Care received a Referral for Physical Therapy. We have processed the referral for a Start of Care on 09/24.     If you have any questions or concerns, please feel free to contact us at [phone]. Follow the prompts, enter your five digit zip code, and you will be directed to your care team on [facility].

## 2024-09-23 NOTE — ANESTHESIA PREPROCEDURE EVALUATION
Patient: Mariaelena Goodwin    Procedure Information       Date/Time: 09/23/24 0835    Procedure: OPEN TOTAL KNEE ARTHROPLASTY (Right: Knee)    Location: [facility] / [facility]    Surgeons: Lefty Arambula DO          Vitals:    09/23/24 0657   BP: 128/74   Pulse: 71   Resp: 16   Temp: 36.5 °C (97.7 °F)   SpO2: 97%       Past Surgical History:   Procedure Laterality Date    APPENDECTOMY      CATARACT EXTRACTION      ROTATOR CUFF REPAIR Right     Rotator Cuff Repair    TOTAL HIP ARTHROPLASTY Right     TOTAL KNEE ARTHROPLASTY Left     Knee Replacement     Past Medical History:   Diagnosis Date    Acute left ankle pain 05/02/2022    Arthralgia of hip 01/29/2024    Arthritis     COPD (chronic obstructive pulmonary disease) (Multi)     GERD (gastroesophageal reflux disease)     Hyperlipidemia     Hypothyroid     Pain of foot 01/29/2024    Peroneal tendon tear, left, initial encounter 05/02/2022    Personal history of other diseases of the respiratory system 11/20/2020    History of sinus problem    Primary osteoarthritis of right knee     Respiratory symptoms 01/29/2024    Sciatica     Shortness of breath 01/29/2024       Current Facility-Administered Medications:     lactated Ringer's infusion, 20 mL/hr, intravenous, Continuous, Rodriguez Ibarra MD    lactated Ringer's infusion, 20 mL/hr, intravenous, Continuous, Lefty Arambula DO, Last Rate: 20 mL/hr at 09/23/24 0730, 20 mL/hr at 09/23/24 0730  Prior to Admission medications    Medication Sig Start Date End Date Taking? Authorizing Provider   acetaminophen (Tylenol) 500 mg tablet Take 1 tablet (500 mg) by mouth every 6 hours if needed for mild pain (1 - 3).    Historical Provider, MD   albuterol (Ventolin HFA) 90 mcg/actuation inhaler INHALE 1-2 PUFFS EVERY 4-6 HOURS AS NEEDED AND AS DIRECTED. 12/14/20   Historical Provider, MD   ascorbic acid (Vitamin C) 250 mg tablet Take 1 tablet (250 mg) by mouth once daily.    Historical Provider, MD   aspirin 325 mg  "tablet Take 1 tablet (325 mg) by mouth once daily for 14 days. 9/19/24 10/4/24  THOMAS Joseph   aspirin 81 mg EC tablet Take 1 tablet (81 mg) by mouth once daily.    Historical Provider, MD   cholecalciferol (Vitamin D3) 25 MCG (1000 UT) tablet Take 1 tablet (1,000 Units) by mouth once daily.    Historical Provider, MD   docusate sodium (Colace) 100 mg capsule Take 1 capsule (100 mg) by mouth 2 times a day for 10 days. 9/19/24 9/30/24  THOMAS Joseph   HYDROcodone-acetaminophen (Norco) 5-325 mg tablet Take 2 tablets by mouth every 8 hours if needed for severe pain (7 - 10) for up to 7 days. 9/19/24 9/27/24  THOMAS Joseph   ibuprofen/diphenhydramine cit (ADVIL PM ORAL) Take by mouth.    Historical Provider, MD   multivitamin tablet Take 1 tablet by mouth once daily.    Historical Provider, MD     Allergies   Allergen Reactions    Latex Rash     Social History     Tobacco Use    Smoking status: Never    Smokeless tobacco: Never   Substance Use Topics    Alcohol use: Yes     Alcohol/week: 7.0 standard drinks of alcohol     Types: 7 Standard drinks or equivalent per week         Chemistry    Lab Results   Component Value Date/Time     09/05/2024 0946    K 4.4 09/05/2024 0946     09/05/2024 0946    CO2 28 09/05/2024 0946    BUN 13 09/05/2024 0946    CREATININE 0.74 09/05/2024 0946    Lab Results   Component Value Date/Time    CALCIUM 9.6 09/05/2024 0946    ALKPHOS 67 09/05/2024 0946    AST 16 09/05/2024 0946    ALT 14 09/05/2024 0946    BILITOT 0.8 09/05/2024 0946          Lab Results   Component Value Date/Time    WBC 6.3 09/05/2024 0946    HGB 15.3 09/05/2024 0946    HCT 45.4 09/05/2024 0946     09/05/2024 0946     No results found for: \"PROTIME\", \"PTT\", \"INR\"  Encounter Date: 09/05/24   ECG 12 lead   Result Value    Ventricular Rate 67    Atrial Rate 67    IN Interval 142    QRS Duration 80    QT Interval 416    QTC Calculation(Bazett) 439    P Axis 43    R Axis 4 "    T Axis 28    QRS Count 11    Q Onset 226    P Onset 155    P Offset 206    T Offset 434    QTC Fredericia 431    Narrative    Normal sinus rhythm  Septal infarct , age undetermined  Abnormal ECG  When compared with ECG of 20-FEB-2019 09:33,  Septal infarct is now Present     No results found for this or any previous visit from the past 1095 days.      Relevant Problems   Cardiac   (+) Mixed hyperlipidemia      Pulmonary   (+) Chronic obstructive pulmonary disease (Multi)      Neuro   (+) Sciatica      GI   (+) Gastroesophageal reflux disease without esophagitis      Endocrine   (+) Hypothyroidism, unspecified      Musculoskeletal   (+) Degenerative joint disease   (+) Generalized osteoarthritis   (+) Osteoarthritis of right knee, unspecified osteoarthritis type   (+) Spondylosis of lumbar spine       Clinical information reviewed:   Tobacco  Allergies    Med Hx  Surg Hx  OB Status  Fam Hx  Soc Hx        NPO Detail:  NPO/Void Status  Carbohydrate Drink Given Prior to Surgery? : N  Date of Last Liquid: 09/22/24  Time of Last Liquid: 2300  Date of Last Solid: 09/22/24  Time of Last Solid: 2300  Last Intake Type: Clear fluids  Time of Last Void: 0600         Physical Exam    Airway  Mallampati: II     Cardiovascular - normal exam     Dental    Pulmonary    Abdominal            Anesthesia Plan    History of general anesthesia?: yes  History of complications of general anesthesia?: no    ASA 3     spinal and regional     The patient is not a current smoker.  Patient was not previously instructed to abstain from smoking on day of procedure.  Patient did not smoke on day of procedure.  Education provided regarding risk of obstructive sleep apnea.  intravenous induction   Anesthetic plan and risks discussed with patient.    Plan discussed with CRNA.

## 2024-09-23 NOTE — CONSULTS
Consults    Reason For Consult  Copd, post op    History Of Present Illness  Mariaelena Goodwin is a 79 y.o. female presenting with arthritis in her right knee, status post TKR by Dr Arambula. She had spinal, and about 50 ml EBL. She is up in chair, already has feeling in her leg and has been to bathroom. She is feeling well, no sob, chest discomfort, nausea, cough etc. Transiently she was on 2 liters oxygen, but is now on room diane     Past Medical History  COPD, mild. Has flares 1-2 times a year    Surgical History  Cataract removal  Left TKR  THR  Right rotator cuff repair  Appendectomy     Social History  She reports that she has never smoked. She has never used smokeless tobacco. She reports current alcohol use of about 7.0 standard drinks of alcohol per week. She reports that she does not use drugs.    Family History  Family History   Problem Relation Name Age of Onset    Heart disease Mother      Hypertension Mother          Allergies  Latex    Review of Systems   Constitutional: Negative.    HENT: Negative.     Eyes: Negative.    Respiratory: Negative.     Cardiovascular: Negative.    Gastrointestinal: Negative.    Genitourinary: Negative.    Musculoskeletal:  Positive for arthralgias.   Neurological: Negative.    Hematological: Negative.    Psychiatric/Behavioral: Negative.          Physical Exam  Constitutional:       Appearance: Normal appearance.   HENT:      Head: Normocephalic.      Nose: Nose normal.      Mouth/Throat:      Mouth: Mucous membranes are dry.   Eyes:      Extraocular Movements: Extraocular movements intact.      Pupils: Pupils are equal, round, and reactive to light.   Cardiovascular:      Rate and Rhythm: Normal rate and regular rhythm.      Pulses: Normal pulses.   Pulmonary:      Comments: Slightly prolonged expiratory phase. No wheezing or adventitious sounds  Abdominal:      General: Bowel sounds are normal.      Palpations: Abdomen is soft.   Musculoskeletal:      Comments: No edema.  Equal pulses   Skin:     General: Skin is warm and dry.   Neurological:      General: No focal deficit present.      Mental Status: She is alert. Mental status is at baseline.   Psychiatric:      Comments: Somewhat anxious.   Pressured speech          Last Recorded Vitals  /76   Pulse 90   Temp 36.4 °C (97.5 °F)   Resp 16   Wt 92.4 kg (203 lb 11.3 oz)   SpO2 95%     Relevant Results     Assessment/Plan     POD 0, Right TKR. She is anxiously awaiting the arrival of PT. Denies much pain at this time. Dvt prophylaxis per ortho. Transient hypoxia in OR  likely secondary to anesthesia etc  COPD, mild. Does not use inhalers on regular basis. Should be ok just with IS, pulm hygiene  Lab in am  Thanks for the consult , will follow as needed    Marta Prado MD

## 2024-09-23 NOTE — PROGRESS NOTES
Physical Therapy    Physical Therapy Evaluation    Patient Name: Mariaelena Goodwin  MRN: [id number]  Department: [address]  Room: [address] [place] [address]  Today's Date: 9/23/2024   Time Calculation  Start Time: 1430  Stop Time: 1455  Time Calculation (min): 25 min    Assessment/Plan   PT Assessment  PT Assessment Results: Decreased mobility, Orthopedic restrictions  Rehab Prognosis: Excellent  Barriers to Discharge: none  Evaluation/Treatment Tolerance: Patient tolerated treatment well  Medical Staff Made Aware: Yes  Strengths: Ability to acquire knowledge  Barriers to Participation: Comorbidities  End of Session Communication: Bedside nurse  End of Session Patient Position: Up in chair, Alarm off, not on at start of session (RN is OK with no alarm, Pt is using her call light appropriately.)  IP OR SWING BED PT PLAN  Inpatient or Swing Bed: Inpatient  PT Plan  Treatment/Interventions: Bed mobility, Transfer training, Gait training, Stair training, Strengthening, Therapeutic exercise  PT Plan: Ongoing PT  PT Frequency: 5 times per week  PT Discharge Recommendations: Moderate intensity level of continued care  Equipment Recommended upon Discharge: Wheeled walker  PT Recommended Transfer Status: Assist x1  PT - OK to Discharge: Yes (Per PT POC)      Subjective   General Visit Information:  General  Reason for Referral: 80 yo female admitted 2' to OA R knee, s/p R TKA  Referred By: Dr. JACK Arambula  Past Medical History Relevant to Rehab: OA, COPD, HLD, hypothyroiism, sciatica  Family/Caregiver Present: No  Prior to Session Communication: Bedside nurse  Patient Position Received: Up in chair, Alarm off, not on at start of session  Preferred Learning Style: verbal, visual, written  General Comment: Pt is pleasant and agreeable to work with PT. PT went over her TKA precautions and Pt performed her TKA protocol ex's. Pt is giving good effort theroughout her session.  Home Living:  Home Living  Type of Home: House  Lives With: Spouse  Home  Adaptive Equipment: Walker rolling or standard  Home Layout: Two level, Stairs to alternate level with rails  Alternate Level Stairs-Rails: Both  Alternate Level Stairs-Number of Steps: 14  Home Access: Stairs to enter without rails  Entrance Stairs-Rails: None (Pt ststes that she uses the door frame)  Entrance Stairs-Number of Steps: 3  Bathroom Shower/Tub: Tub/shower unit  Bathroom Equipment: Grab bars in shower, Raised toilet seat with rails  Prior Level of Function:  Prior Function Per Pt/Caregiver Report  Level of [place]: Independent with ADLs and functional transfers, Independent with homemaking with ambulation (no device)  Ambulatory Assistance: Independent  Precautions:  Precautions  LE Weight Bearing Status: Weight Bearing as Tolerated (R LE)  Post-Surgical Precautions: Right total knee precautions    Vital Signs (Past 2hrs)                 Objective   Pain:  Pain Assessment  Pain Assessment: 0-10  0-10 (Numeric) Pain Score: 3  Pain Type: Surgical pain  Pain Location: Knee  Pain Orientation: Right  Cognition:  Cognition  Overall Cognitive Status: Within Functional Limits  Orientation Level: Oriented X4    General Assessments:  General Observation  General Observation: Pt performed the following  long sitting ex's; B AP, R QS, glut sets, R heel slides, R hip abd and R SAQ each x 20 reps               Activity Tolerance  Endurance: Endurance does not limit participation in activity         Strength  Strength Comments:  (B UE and LE are 4+ of 5)  Static Sitting Balance  Static Sitting-Balance Support: Bilateral upper extremity supported, Feet supported  Static Sitting-Level of Assistance: Independent    Static Standing Balance  Static Standing-Balance Support: Bilateral upper extremity supported (wheeled walker)  Static Standing-Level of Assistance: Contact guard  Dynamic Standing Balance  Dynamic Standing-Balance Support: Bilateral upper extremity supported (wheeled walker)  Dynamic Standing-Level of  Assistance: Contact guard, Minimum assistance  Functional Assessments:  Bed Mobility  Bed Mobility: No    Transfers  Transfer: Yes  Transfer 1  Transfer From 1: Chair with arms to  Transfer to 1: Stand  Technique 1: Sit to stand, Stand to sit  Transfer Device 1:  (wheeled walker)  Transfer Level of Assistance 1: Contact guard    Ambulation/Gait Training  Ambulation/Gait Training Performed: Yes  Ambulation/Gait Training 1  Surface 1: Level tile  Device 1: Rolling walker  Assistance 1: Contact guard, Minimum assistance  Quality of Gait 1: Decreased step length (Dcreased cheo)  Comments/Distance (ft) 1: 6 feet forward backward x 2    Stairs  Stairs: No  Extremity/Trunk Assessments:  RUE   RUE : Within Functional Limits  LUE   LUE: Within Functional Limits  RLE   RLE : Within Functional Limits  LLE   LLE : Within Functional Limits  Outcome Measures:  [facility] Basic Mobility  Turning from your back to your side while in a flat bed without using bedrails: A lot  Moving from lying on your back to sitting on the side of a flat bed without using bedrails: A lot  Moving to and from bed to chair (including a wheelchair): A little  Standing up from a chair using your arms (e.g. wheelchair or bedside chair): A little  To walk in hospital room: A little  Climbing 3-5 steps with railing: A lot  Basic Mobility - Total Score: 15    Encounter Problems       Encounter Problems (Active)       Mobility       STG - Patient will ambulate 150 feet MOD I with wheeled walker (Progressing)       Start:  09/23/24    Expected End:  10/07/24            STG - Patient will ascend and descend a flight of stairs MOD I with B rails (Progressing)       Start:  09/23/24    Expected End:  10/07/24               PT Transfers       STG - Patient to transfer to and from sit to supine independently (Progressing)       Start:  09/23/24    Expected End:  10/07/24            STG - Patient will transfer sit to and from stand MOD I with wheeled walker  (Progressing)       Start:  09/23/24    Expected End:  10/07/24                   Education Documentation  Handouts, taught by Michael Boswell, PT at 9/23/2024  3:32 PM.  Learner: Patient  Readiness: Eager  Method: Demonstration, Handout  Response: Demonstrated Understanding    Precautions, taught by Michael Boswell, PT at 9/23/2024  3:32 PM.  Learner: Patient  Readiness: Eager  Method: Demonstration, Handout  Response: Demonstrated Understanding    Home Exercise Program, taught by Michael Boswell, PT at 9/23/2024  3:32 PM.  Learner: Patient  Readiness: Eager  Method: Demonstration, Handout  Response: Demonstrated Understanding    Education Comments  No comments found.

## 2024-09-24 VITALS
RESPIRATION RATE: 18 BRPM | TEMPERATURE: 97.8 F | HEIGHT: 71 IN | SYSTOLIC BLOOD PRESSURE: 125 MMHG | WEIGHT: 203.71 LBS | DIASTOLIC BLOOD PRESSURE: 85 MMHG | HEART RATE: 78 BPM | BODY MASS INDEX: 28.52 KG/M2 | OXYGEN SATURATION: 95 %

## 2024-09-24 PROBLEM — M17.11 OSTEOARTHRITIS OF RIGHT KNEE, UNSPECIFIED OSTEOARTHRITIS TYPE: Status: RESOLVED | Noted: 2024-09-23 | Resolved: 2024-09-24

## 2024-09-24 PROBLEM — M17.11 PRIMARY OSTEOARTHRITIS OF RIGHT KNEE: Status: RESOLVED | Noted: 2024-09-23 | Resolved: 2024-09-24

## 2024-09-24 LAB
ANION GAP SERPL CALC-SCNC: 11 MMOL/L (ref 10–20)
BUN SERPL-MCNC: 12 MG/DL (ref 6–23)
CALCIUM SERPL-MCNC: 9 MG/DL (ref 8.6–10.3)
CHLORIDE SERPL-SCNC: 104 MMOL/L (ref 98–107)
CO2 SERPL-SCNC: 25 MMOL/L (ref 21–32)
CREAT SERPL-MCNC: 0.73 MG/DL (ref 0.5–1.05)
EGFRCR SERPLBLD CKD-EPI 2021: 84 ML/MIN/1.73M*2
ERYTHROCYTE [DISTWIDTH] IN BLOOD BY AUTOMATED COUNT: 12.5 % (ref 11.5–14.5)
GLUCOSE SERPL-MCNC: 119 MG/DL (ref 74–99)
HCT VFR BLD AUTO: 37.6 % (ref 36–46)
HGB BLD-MCNC: 12.6 G/DL (ref 12–16)
MCH RBC QN AUTO: 31.4 PG (ref 26–34)
MCHC RBC AUTO-ENTMCNC: 33.5 G/DL (ref 32–36)
MCV RBC AUTO: 94 FL (ref 80–100)
NRBC BLD-RTO: 0 /100 WBCS (ref 0–0)
PLATELET # BLD AUTO: 247 X10*3/UL (ref 150–450)
POTASSIUM SERPL-SCNC: 4 MMOL/L (ref 3.5–5.3)
RBC # BLD AUTO: 4.01 X10*6/UL (ref 4–5.2)
SODIUM SERPL-SCNC: 136 MMOL/L (ref 136–145)
WBC # BLD AUTO: 13.9 X10*3/UL (ref 4.4–11.3)

## 2024-09-24 PROCEDURE — 36415 COLL VENOUS BLD VENIPUNCTURE: CPT | Performed by: NURSE PRACTITIONER

## 2024-09-24 PROCEDURE — 85027 COMPLETE CBC AUTOMATED: CPT | Performed by: NURSE PRACTITIONER

## 2024-09-24 PROCEDURE — 97110 THERAPEUTIC EXERCISES: CPT | Mod: KX,GP

## 2024-09-24 PROCEDURE — 97116 GAIT TRAINING THERAPY: CPT | Mod: KX,GP

## 2024-09-24 PROCEDURE — 7100000011 HC EXTENDED STAY RECOVERY HOURLY - NURSING UNIT

## 2024-09-24 PROCEDURE — 2500000004 HC RX 250 GENERAL PHARMACY W/ HCPCS (ALT 636 FOR OP/ED): Mod: JZ | Performed by: NURSE PRACTITIONER

## 2024-09-24 PROCEDURE — 99238 HOSP IP/OBS DSCHRG MGMT 30/<: CPT | Performed by: NURSE PRACTITIONER

## 2024-09-24 PROCEDURE — 2500000001 HC RX 250 WO HCPCS SELF ADMINISTERED DRUGS (ALT 637 FOR MEDICARE OP): Performed by: NURSE PRACTITIONER

## 2024-09-24 PROCEDURE — 80048 BASIC METABOLIC PNL TOTAL CA: CPT | Performed by: NURSE PRACTITIONER

## 2024-09-24 RX ORDER — HYDROCODONE BITARTRATE AND ACETAMINOPHEN 5; 325 MG/1; MG/1
2 TABLET ORAL EVERY 8 HOURS PRN
Qty: 18 TABLET | Refills: 0 | Status: SHIPPED | OUTPATIENT
Start: 2024-09-24 | End: 2024-09-27

## 2024-09-24 RX ORDER — HYDROCODONE BITARTRATE AND ACETAMINOPHEN 5; 325 MG/1; MG/1
1 TABLET ORAL ONCE
Status: DISCONTINUED | OUTPATIENT
Start: 2024-09-24 | End: 2024-09-24 | Stop reason: HOSPADM

## 2024-09-24 RX ORDER — NAPROXEN 500 MG/1
500 TABLET ORAL ONCE
Status: COMPLETED | OUTPATIENT
Start: 2024-09-24 | End: 2024-09-24

## 2024-09-24 ASSESSMENT — COGNITIVE AND FUNCTIONAL STATUS - GENERAL
TURNING FROM BACK TO SIDE WHILE IN FLAT BAD: A LITTLE
STANDING UP FROM CHAIR USING ARMS: A LITTLE
MOVING TO AND FROM BED TO CHAIR: A LITTLE
WALKING IN HOSPITAL ROOM: A LITTLE
MOBILITY SCORE: 18
CLIMB 3 TO 5 STEPS WITH RAILING: A LITTLE
MOVING FROM LYING ON BACK TO SITTING ON SIDE OF FLAT BED WITH BEDRAILS: A LITTLE
HELP NEEDED FOR BATHING: A LITTLE
DAILY ACTIVITIY SCORE: 21
WALKING IN HOSPITAL ROOM: A LITTLE
DRESSING REGULAR LOWER BODY CLOTHING: A LOT
CLIMB 3 TO 5 STEPS WITH RAILING: A LITTLE

## 2024-09-24 ASSESSMENT — PAIN - FUNCTIONAL ASSESSMENT
PAIN_FUNCTIONAL_ASSESSMENT: 0-10

## 2024-09-24 ASSESSMENT — PAIN SCALES - GENERAL
PAINLEVEL_OUTOF10: 8
PAINLEVEL_OUTOF10: 0 - NO PAIN

## 2024-09-24 NOTE — CARE PLAN
Problem: Pain  Goal: Takes deep breaths with improved pain control throughout the shift  Outcome: Progressing  Goal: Turns in bed with improved pain control throughout the shift  Outcome: Progressing  Goal: Walks with improved pain control throughout the shift  Outcome: Progressing  Goal: Performs ADL's with improved pain control throughout shift  Outcome: Progressing  Goal: Participates in PT with improved pain control throughout the shift  Outcome: Progressing  Goal: Free from opioid side effects throughout the shift  Outcome: Progressing  Goal: Free from acute confusion related to pain meds throughout the shift  Outcome: Progressing     The patient's goals for the shift include  ambulation, pain control, and sleeping at least 6 hours.      The clinical goals for the shift include Pain management and ambulation tolerance.      Over the shift, the patient did make progress toward the following goals. Barriers to progression include pain.   Recommendations to address these barriers include medications, relaxation, elevation, and ice.   Patient tolerated ambulation to the bathroom this shift. Patient was able to void and have a BM.   Patient was able to sleep at least 6 hours this shift, on and off.

## 2024-09-24 NOTE — PROGRESS NOTES
09/24/24 1000   Discharge Planning   Assistance Needed Patient to DC today to [facility], private pay. AVS and dave cecilia sent to facility via [facility]. Patient's  to transport her to facility. Patient has needed paperwork for facility as well. LSW completed PASSR as requested by facility. Bedside nurse provided with number for nurse to nurse report.

## 2024-09-24 NOTE — DISCHARGE SUMMARY
Discharge Diagnosis  Osteoarthritis of right knee, unspecified osteoarthritis type    Issues Requiring Follow-Up  Post surgical follow up SP R TKA    Test Results Pending At Discharge  Pending Labs       No current pending labs.            Hospital Course  79 year old female who underwent a right total knee arthroplasty.  No intraoperative complications and she was discharged to PACU in stable condition.  She was admitted to the floor overnight for monitoring, NV checks, pain control, and continued PT.  She is doing well, pain controlled.  Planning for DC to SNF today.      Pertinent Physical Exam At Time of Discharge  Physical Exam  Vitals reviewed.   Constitutional:       Appearance: Normal appearance.   HENT:      Head: Normocephalic and atraumatic.   Eyes:      Conjunctiva/sclera: Conjunctivae normal.      Pupils: Pupils are equal, round, and reactive to light.   Cardiovascular:      Rate and Rhythm: Normal rate and regular rhythm.      Pulses: Normal pulses.   Pulmonary:      Effort: Pulmonary effort is normal.   Abdominal:      Palpations: Abdomen is soft.   Musculoskeletal:      Cervical back: Neck supple.      Comments: Right knee dressing C/D/I, ace wrap in place, thigh and calf supple, leg and foot warm and well perfused, SILT S/S/SPN/DPN distributions, active right knee flexion 70 degrees, extension 0 degrees   Skin:     General: Skin is warm and dry.      Capillary Refill: Capillary refill takes less than 2 seconds.   Neurological:      General: No focal deficit present.      Mental Status: She is alert and oriented to person, place, and time.   Psychiatric:         Mood and Affect: Mood normal.         Behavior: Behavior normal.         Thought Content: Thought content normal.         Judgment: Judgment normal.       Home Medications     Medication List      START taking these medications     aspirin 325 mg tablet; Take 1 tablet (325 mg) by mouth once daily for 14   days.; Replaces: aspirin 81 mg EC  tablet   docusate sodium 100 mg capsule; Commonly known as: Colace; Take 1   capsule (100 mg) by mouth 2 times a day for 10 days.   HYDROcodone-acetaminophen 5-325 mg tablet; Commonly known as: Norco;   Take 2 tablets by mouth every 8 hours if needed for severe pain (7 - 10)   for up to 7 days.     CONTINUE taking these medications     ascorbic acid 250 mg tablet; Commonly known as: Vitamin C   multivitamin tablet   Ventolin HFA 90 mcg/actuation inhaler; Generic drug: albuterol   Vitamin D3 25 MCG (1000 UT) tablet; Generic drug: cholecalciferol     STOP taking these medications     acetaminophen 500 mg tablet; Commonly known as: Tylenol   ADVIL PM ORAL   aspirin 81 mg EC tablet; Replaced by: aspirin 325 mg tablet       Outpatient Follow-Up  Future Appointments   Date Time Provider Department Center   9/24/2024 To Be Determined Daniela Carmen, PT [facility]   12/9/2024  3:00 PM Sohail Leos MD [id number] [facility]       Nenita Osorio, APRN-CNP

## 2024-09-24 NOTE — PROGRESS NOTES
Physical Therapy    Physical Therapy Treatment    Patient Name: Mariaelena Goodwin  MRN: [id number]  Department: [address]  Room: [address] [place] [address]  Today's Date: 9/24/2024  Time Calculation  Start Time: 0932  Stop Time: 0959  Time Calculation (min): 27 min         Assessment/Plan   PT Assessment  PT Assessment Results: Decreased mobility, Orthopedic restrictions  Rehab Prognosis: Excellent  Barriers to Discharge: none  Evaluation/Treatment Tolerance: Patient tolerated treatment well  Medical Staff Made Aware: Yes  Strengths: Ability to acquire knowledge  Barriers to Participation: Comorbidities  End of Session Communication: Bedside nurse  End of Session Patient Position: Up in chair, Alarm off, not on at start of session (RN is OK with no alarm as Pt is using her call light appropriately)  PT Plan  Inpatient/Swing Bed or Outpatient: Inpatient  PT Plan  Treatment/Interventions: Bed mobility, Transfer training, Gait training, Stair training, Strengthening, Therapeutic exercise  PT Plan: Ongoing PT  PT Frequency: 5 times per week  PT Discharge Recommendations: Moderate intensity level of continued care  Equipment Recommended upon Discharge: Wheeled walker  PT Recommended Transfer Status: Assist x1  PT - OK to Discharge: Yes (Per PT POC)      General Visit Information:   PT  Visit  PT Received On: 09/24/24  Response to Previous Treatment: Patient with no complaints from previous session.  General  Reason for Referral: 78 yo female admitted 2' to OA R knee, s/p R TKA  Referred By: Dr. JACK Arambula  Past Medical History Relevant to Rehab: OA, COPD, HLD, hypothyroiism, sciatica  Family/Caregiver Present: No  Prior to Session Communication: Bedside nurse  Patient Position Received: Bed, 3 rail up, Alarm on (Pt sitting up at EOB)  Preferred Learning Style: verbal, visual, written  General Comment: Pt is pleasant and agreeable to work with PT. PT went over her TKA precautions and Pt performed her TKA protocol ex's. Pt is giving good  effort theroughout her session.    Subjective   Precautions:  Precautions  LE Weight Bearing Status: Weight Bearing as Tolerated (R LE)  Post-Surgical Precautions: Right total knee precautions    Vital Signs (Past 2hrs)                 Objective   Pain:  Pain Assessment  Pain Assessment: 0-10  0-10 (Numeric) Pain Score:  (no pain at rest 8/10 with movement and activity)  Pain Type: Surgical pain  Pain Location: Knee  Pain Orientation: Right  Cognition:  Cognition  Overall Cognitive Status: Within Functional Limits  Orientation Level: Oriented X4  Coordination:     Postural Control:  Static Sitting Balance  Static Sitting-Balance Support: Bilateral upper extremity supported, Feet supported  Static Sitting-Level of Assistance: Independent  Static Standing Balance  Static Standing-Balance Support: Bilateral upper extremity supported (wheeled walker)  Static Standing-Level of Assistance: Close supervision  Dynamic Standing Balance  Dynamic Standing-Balance Support: Bilateral upper extremity supported (wheeled waker)  Dynamic Standing-Level of Assistance: Close supervision, Contact guard  Extremity/Trunk Assessments:  RLE   RLE : Within Functional Limits  LLE   LLE : Within Functional Limits  Activity Tolerance:  Activity Tolerance  Endurance: Endurance does not limit participation in activity  Treatments:  Therapeutic Exercise  Therapeutic Exercise Performed: Yes  Therapeutic Exercise Activity 1: Pt performed the following long sitting ex's; B AP. R QS, glut sets, R heel slides, R hip abd and R SAQ each x 20 reps    Bed Mobility  Bed Mobility: No    Ambulation/Gait Training  Ambulation/Gait Training Performed: Yes  Ambulation/Gait Training 1  Surface 1: Level tile  Device 1: Rolling walker  Assistance 1: Contact guard  Quality of Gait 1: Decreased step length (Decreased cheo)  Comments/Distance (ft) 1: 70 feet x 2  Transfers  Transfer: Yes  Transfer 1  Transfer From 1: Bed to  Transfer to 1: Stand  Technique 1: Sit  to stand, Stand to sit  Transfer Device 1:  (wheeled waker)  Transfer Level of Assistance 1: Close supervision, Contact guard  Trials/Comments 1: Pt also performed a car transfer with her wheeled waker close supervision    Stairs  Stairs: Yes  Stairs  Rails 1: Bilateral  Curb Step 1: No  Device 1: No device  Assistance 1: Close supervision  Comment/Number of Steps 1: 4    Outcome Measures:  [facility] Basic Mobility  Turning from your back to your side while in a flat bed without using bedrails: A little  Moving from lying on your back to sitting on the side of a flat bed without using bedrails: A little  Moving to and from bed to chair (including a wheelchair): A little  Standing up from a chair using your arms (e.g. wheelchair or bedside chair): A little  To walk in hospital room: A little  Climbing 3-5 steps with railing: A little  Basic Mobility - Total Score: 18    Education Documentation  No documentation found.  Education Comments  No comments found.        OP EDUCATION:       Encounter Problems       Encounter Problems (Active)       Mobility       STG - Patient will ambulate 150 feet MOD I with wheeled walker (Progressing)       Start:  09/23/24    Expected End:  10/07/24            STG - Patient will ascend and descend a flight of stairs MOD I with B rails (Progressing)       Start:  09/23/24    Expected End:  10/07/24               PT Transfers       STG - Patient to transfer to and from sit to supine independently (Progressing)       Start:  09/23/24    Expected End:  10/07/24            STG - Patient will transfer sit to and from stand MOD I with wheeled walker (Progressing)       Start:  09/23/24    Expected End:  10/07/24

## 2024-09-24 NOTE — CARE PLAN
The patient's goals for the shift include  pain management    The clinical goals for the shift include pain management

## 2024-09-26 ENCOUNTER — NURSING HOME VISIT (OUTPATIENT)
Dept: POST ACUTE CARE | Facility: EXTERNAL LOCATION | Age: 79
End: 2024-09-26
Payer: MEDICARE

## 2024-09-26 DIAGNOSIS — J44.9 CHRONIC OBSTRUCTIVE PULMONARY DISEASE, UNSPECIFIED COPD TYPE (MULTI): ICD-10-CM

## 2024-09-26 DIAGNOSIS — M17.11 LOCALIZED OSTEOARTHRITIS OF RIGHT KNEE: ICD-10-CM

## 2024-09-26 PROCEDURE — 99305 1ST NF CARE MODERATE MDM 35: CPT | Performed by: FAMILY MEDICINE

## 2024-09-26 NOTE — LETTER
Patient: Mariaelena Goodwin  : 1945    Encounter Date: 2024    Mariaelena Goodwin is a 79 y.o. female with Chief Complaint of SNF/self-pay ([facility]) H&P    Resident seen 24 -- MP    CC: Self pay SNF ([facility]) H&P    : 1945  SNF H&P done 24  DC Summary dated 24 reviewed 24  ALlergy: Latex  DNR-CCA    S: 80 yo woman with COPD and OA admitted to self-pay SNF rehab after hospitalized <72h after elective Right TKA. No CP/SOB. Med list & Problem list reviewed.    O: VSS AFEB 207# Awake, alert, NAD. Chest CTA, Heart RRR. Ext no c/c/e. Right knee incision c/d/i with silver dressing.    A/P:  # [facility]: self pay PT/OT  # OA s/p right knee TKA: f/u Dr Arambula. Recommend topical ice, naproxen, and norco 5-10 mg q4h prn pain.  mg for dvt prevention.  # FEN: MVit, Vit C.  # COPD: prn ventolin  # OK for 1 alcoholic drink in evening.    Past Surgical History:   Procedure Laterality Date   • APPENDECTOMY     • CATARACT EXTRACTION     • ROTATOR CUFF REPAIR Right     Rotator Cuff Repair   • TOTAL HIP ARTHROPLASTY Right    • TOTAL KNEE ARTHROPLASTY Left     Knee Replacement      Social History     Socioeconomic History   • Marital status:      Spouse name: Not on file   • Number of children: Not on file   • Years of education: Not on file   • Highest education level: Not on file   Occupational History   • Not on file   Tobacco Use   • Smoking status: Never   • Smokeless tobacco: Never   Vaping Use   • Vaping status: Never Used   Substance and Sexual Activity   • Alcohol use: Yes     Alcohol/week: 7.0 standard drinks of alcohol     Types: 7 Standard drinks or equivalent per week   • Drug use: Never   • Sexual activity: Not on file   Other Topics Concern   • Not on file   Social History Narrative   • Not on file     Social Determinants of Health     Financial Resource Strain: Low Risk  (2024)    Overall Financial Resource Strain (CARDIA)    • Difficulty of Paying Living Expenses: Not  hard at all   Food Insecurity: Not on file   Transportation Needs: No Transportation Needs (9/23/2024)    PRAPARE - Transportation    • Lack of Transportation (Medical): No    • Lack of Transportation (Non-Medical): No   Physical Activity: Not on file   Stress: Not on file   Social Connections: Not on file   Intimate Partner Violence: Not on file   Housing Stability: Low Risk  (9/23/2024)    Housing Stability Vital Sign    • Unable to Pay for Housing in the Last Year: No    • Number of Times Moved in the Last Year: 0    • Homeless in the Last Year: No     Past Medical History:   Diagnosis Date   • Acute left ankle pain 05/02/2022   • Arthralgia of hip 01/29/2024   • Arthritis    • COPD (chronic obstructive pulmonary disease) (Multi)    • GERD (gastroesophageal reflux disease)    • Hyperlipidemia    • Hypothyroid    • Pain of foot 01/29/2024   • Peroneal tendon tear, left, initial encounter 05/02/2022   • Personal history of other diseases of the respiratory system 11/20/2020    History of sinus problem   • Primary osteoarthritis of right knee    • Respiratory symptoms 01/29/2024   • Sciatica    • Shortness of breath 01/29/2024      Family History   Problem Relation Name Age of Onset   • Heart disease Mother     • Hypertension Mother          Review of Systems   Constitutional:  Negative for chills, fatigue and fever.   HENT:  Negative for rhinorrhea and sore throat.    Eyes:  Negative for pain and redness.   Respiratory:  Negative for cough and shortness of breath.    Cardiovascular:  Negative for chest pain and palpitations.   Gastrointestinal:  Negative for abdominal pain and blood in stool.   Endocrine: Negative for polydipsia and polyuria.   Genitourinary:  Negative for dysuria and hematuria.   Musculoskeletal:  Negative for back pain and neck stiffness.   Skin:  Positive for wound. Negative for rash.   Allergic/Immunologic: Negative for environmental allergies and food allergies.   Neurological:  Positive for  weakness. Negative for headaches.   Hematological:  Negative for adenopathy. Does not bruise/bleed easily.   Psychiatric/Behavioral:  Negative for hallucinations and suicidal ideas.       There were no vitals taken for this visit.  Physical Exam  Vitals reviewed.   Constitutional:       General: She is not in acute distress.     Appearance: She is not ill-appearing.   HENT:      Head: Normocephalic and atraumatic.      Right Ear: Tympanic membrane normal.      Left Ear: Tympanic membrane normal.      Nose: No congestion or rhinorrhea.      Mouth/Throat:      Pharynx: No oropharyngeal exudate or posterior oropharyngeal erythema.   Eyes:      Extraocular Movements: Extraocular movements intact.      Conjunctiva/sclera: Conjunctivae normal.      Pupils: Pupils are equal, round, and reactive to light.   Cardiovascular:      Rate and Rhythm: Normal rate and regular rhythm.      Heart sounds: No murmur heard.     No friction rub. No gallop.   Pulmonary:      Effort: Pulmonary effort is normal.      Breath sounds: Normal breath sounds. No wheezing, rhonchi or rales.   Abdominal:      General: There is no distension.      Palpations: Abdomen is soft.      Tenderness: There is no abdominal tenderness. There is no guarding or rebound.   Musculoskeletal:         General: No swelling or deformity.      Cervical back: Normal range of motion and neck supple.      Right lower leg: No edema.      Left lower leg: No edema.   Skin:     Capillary Refill: Capillary refill takes less than 2 seconds.      Coloration: Skin is not jaundiced.      Findings: No rash.      Comments: Right knee incision c/d/I with silver dressing   Neurological:      General: No focal deficit present.      Mental Status: She is alert.      Motor: Weakness present.   Psychiatric:         Mood and Affect: Mood normal.         Behavior: Behavior normal.       Lab Results   Component Value Date    WBC 13.9 (H) 09/24/2024    HGB 12.6 09/24/2024    HCT 37.6  "09/24/2024    MCV 94 09/24/2024     09/24/2024     No results found for: \"CHOL\"  No results found for: \"HDL\"  No results found for: \"LDLCALC\"  No results found for: \"TRIG\"  No components found for: \"CHOLHDL\"  No results found for: \"HGBA1C\"    Assessment/Plan  Problem List Items Addressed This Visit       Chronic obstructive pulmonary disease (Multi)    Localized osteoarthritis of right knee       Electronically Signed By: Marcello Taylor MD   9/28/24  4:14 PM  "

## 2024-09-27 ENCOUNTER — NURSING HOME VISIT (OUTPATIENT)
Dept: POST ACUTE CARE | Facility: EXTERNAL LOCATION | Age: 79
End: 2024-09-27
Payer: MEDICARE

## 2024-09-27 DIAGNOSIS — M17.11 LOCALIZED OSTEOARTHRITIS OF RIGHT KNEE: Primary | ICD-10-CM

## 2024-09-27 DIAGNOSIS — E56.9 VITAMIN DEFICIENCY: ICD-10-CM

## 2024-09-27 DIAGNOSIS — K59.00 CONSTIPATION, UNSPECIFIED CONSTIPATION TYPE: ICD-10-CM

## 2024-09-27 DIAGNOSIS — J44.9 CHRONIC OBSTRUCTIVE PULMONARY DISEASE, UNSPECIFIED COPD TYPE (MULTI): ICD-10-CM

## 2024-09-27 PROCEDURE — 99310 SBSQ NF CARE HIGH MDM 45: CPT | Performed by: NURSE PRACTITIONER

## 2024-09-27 NOTE — PROGRESS NOTES
Mariaelena Goodwin is a 79 y.o. female with Chief Complaint of SNF/self-pay ([facility]) H&P    Resident seen 24 -- MP    CC: Self pay SNF ([facility]) H&P    : 1945  SNF H&P done 24  DC Summary dated 24 reviewed 24  ALlergy: Latex  DNR-CCA    S: 78 yo woman with COPD and OA admitted to self-pay SNF rehab after hospitalized <72h after elective Right TKA. No CP/SOB. Med list & Problem list reviewed.    O: VSS AFEB 207# Awake, alert, NAD. Chest CTA, Heart RRR. Ext no c/c/e. Right knee incision c/d/i with silver dressing.    A/P:  # [facility]: self pay PT/OT  # OA s/p right knee TKA: f/u Dr Arambula. Recommend topical ice, naproxen, and norco 5-10 mg q4h prn pain.  mg for dvt prevention.  # FEN: MVit, Vit C.  # COPD: prn ventolin  # OK for 1 alcoholic drink in evening.    Past Surgical History:   Procedure Laterality Date    APPENDECTOMY      CATARACT EXTRACTION      ROTATOR CUFF REPAIR Right     Rotator Cuff Repair    TOTAL HIP ARTHROPLASTY Right     TOTAL KNEE ARTHROPLASTY Left     Knee Replacement      Social History     Socioeconomic History    Marital status:      Spouse name: Not on file    Number of children: Not on file    Years of education: Not on file    Highest education level: Not on file   Occupational History    Not on file   Tobacco Use    Smoking status: Never    Smokeless tobacco: Never   Vaping Use    Vaping status: Never Used   Substance and Sexual Activity    Alcohol use: Yes     Alcohol/week: 7.0 standard drinks of alcohol     Types: 7 Standard drinks or equivalent per week    Drug use: Never    Sexual activity: Not on file   Other Topics Concern    Not on file   Social History Narrative    Not on file     Social Determinants of Health     Financial Resource Strain: Low Risk  (2024)    Overall Financial Resource Strain (CARDIA)     Difficulty of Paying Living Expenses: Not hard at all   Food Insecurity: Not on file   Transportation Needs: No Transportation Needs  (9/23/2024)    PRAPARE - Transportation     Lack of Transportation (Medical): No     Lack of Transportation (Non-Medical): No   Physical Activity: Not on file   Stress: Not on file   Social Connections: Not on file   Intimate Partner Violence: Not on file   Housing Stability: Low Risk  (9/23/2024)    Housing Stability Vital Sign     Unable to Pay for Housing in the Last Year: No     Number of Times Moved in the Last Year: 0     Homeless in the Last Year: No     Past Medical History:   Diagnosis Date    Acute left ankle pain 05/02/2022    Arthralgia of hip 01/29/2024    Arthritis     COPD (chronic obstructive pulmonary disease) (Multi)     GERD (gastroesophageal reflux disease)     Hyperlipidemia     Hypothyroid     Pain of foot 01/29/2024    Peroneal tendon tear, left, initial encounter 05/02/2022    Personal history of other diseases of the respiratory system 11/20/2020    History of sinus problem    Primary osteoarthritis of right knee     Respiratory symptoms 01/29/2024    Sciatica     Shortness of breath 01/29/2024      Family History   Problem Relation Name Age of Onset    Heart disease Mother      Hypertension Mother          Review of Systems   Constitutional:  Negative for chills, fatigue and fever.   HENT:  Negative for rhinorrhea and sore throat.    Eyes:  Negative for pain and redness.   Respiratory:  Negative for cough and shortness of breath.    Cardiovascular:  Negative for chest pain and palpitations.   Gastrointestinal:  Negative for abdominal pain and blood in stool.   Endocrine: Negative for polydipsia and polyuria.   Genitourinary:  Negative for dysuria and hematuria.   Musculoskeletal:  Negative for back pain and neck stiffness.   Skin:  Positive for wound. Negative for rash.   Allergic/Immunologic: Negative for environmental allergies and food allergies.   Neurological:  Positive for weakness. Negative for headaches.   Hematological:  Negative for adenopathy. Does not bruise/bleed easily.  "  Psychiatric/Behavioral:  Negative for hallucinations and suicidal ideas.       There were no vitals taken for this visit.  Physical Exam  Vitals reviewed.   Constitutional:       General: She is not in acute distress.     Appearance: She is not ill-appearing.   HENT:      Head: Normocephalic and atraumatic.      Right Ear: Tympanic membrane normal.      Left Ear: Tympanic membrane normal.      Nose: No congestion or rhinorrhea.      Mouth/Throat:      Pharynx: No oropharyngeal exudate or posterior oropharyngeal erythema.   Eyes:      Extraocular Movements: Extraocular movements intact.      Conjunctiva/sclera: Conjunctivae normal.      Pupils: Pupils are equal, round, and reactive to light.   Cardiovascular:      Rate and Rhythm: Normal rate and regular rhythm.      Heart sounds: No murmur heard.     No friction rub. No gallop.   Pulmonary:      Effort: Pulmonary effort is normal.      Breath sounds: Normal breath sounds. No wheezing, rhonchi or rales.   Abdominal:      General: There is no distension.      Palpations: Abdomen is soft.      Tenderness: There is no abdominal tenderness. There is no guarding or rebound.   Musculoskeletal:         General: No swelling or deformity.      Cervical back: Normal range of motion and neck supple.      Right lower leg: No edema.      Left lower leg: No edema.   Skin:     Capillary Refill: Capillary refill takes less than 2 seconds.      Coloration: Skin is not jaundiced.      Findings: No rash.      Comments: Right knee incision c/d/I with silver dressing   Neurological:      General: No focal deficit present.      Mental Status: She is alert.      Motor: Weakness present.   Psychiatric:         Mood and Affect: Mood normal.         Behavior: Behavior normal.       Lab Results   Component Value Date    WBC 13.9 (H) 09/24/2024    HGB 12.6 09/24/2024    HCT 37.6 09/24/2024    MCV 94 09/24/2024     09/24/2024     No results found for: \"CHOL\"  No results found for: " "\"HDL\"  No results found for: \"LDLCALC\"  No results found for: \"TRIG\"  No components found for: \"CHOLHDL\"  No results found for: \"HGBA1C\"    Assessment/Plan   Problem List Items Addressed This Visit       Chronic obstructive pulmonary disease (Multi)    Localized osteoarthritis of right knee       "

## 2024-09-28 ASSESSMENT — ENCOUNTER SYMPTOMS
NECK STIFFNESS: 0
SORE THROAT: 0
BLOOD IN STOOL: 0
EYE REDNESS: 0
EYE PAIN: 0
BRUISES/BLEEDS EASILY: 0
PALPITATIONS: 0
COUGH: 0
DYSURIA: 0
HEMATURIA: 0
CHILLS: 0
ABDOMINAL PAIN: 0
WOUND: 1
BACK PAIN: 0
ADENOPATHY: 0
SHORTNESS OF BREATH: 0
FATIGUE: 0
POLYDIPSIA: 0
HALLUCINATIONS: 0
RHINORRHEA: 0
FEVER: 0
HEADACHES: 0
WEAKNESS: 1

## 2024-09-30 ENCOUNTER — NURSING HOME VISIT (OUTPATIENT)
Dept: POST ACUTE CARE | Facility: EXTERNAL LOCATION | Age: 79
End: 2024-09-30
Payer: MEDICARE

## 2024-09-30 DIAGNOSIS — M17.11 LOCALIZED OSTEOARTHRITIS OF RIGHT KNEE: ICD-10-CM

## 2024-09-30 DIAGNOSIS — J44.9 CHRONIC OBSTRUCTIVE PULMONARY DISEASE, UNSPECIFIED COPD TYPE (MULTI): ICD-10-CM

## 2024-09-30 PROCEDURE — 99316 NF DSCHRG MGMT 30 MIN+: CPT | Performed by: FAMILY MEDICINE

## 2024-09-30 NOTE — LETTER
Patient: Mariaelena Goodwin  : 1945    Encounter Date: 2024    Resident seen 24 -- MP    CC: Self pay SNF ([facility]) DC Note (32 minutes spent on discharge visit, documentation, ordering outpatient PT at [facility] in [place]).    : 1945  SNF H&P done 24  DC Summary dated 24 reviewed 24  ALlergy: Latex  DNR-CCA    S: 80 yo woman with COPD and OA admitted to self-pay SNF rehab after hospitalized <72h after elective Right TKA. No CP/SOB. Med list & Problem list reviewed.    O: VSS AFEB 207# Awake, alert, NAD. Chest CTA, Heart RRR. Ext no c/c/e. Right knee incision c/d/i with silver dressing -- small blood wicking onto visible steristrip tail but no saturation of dressing nor leakage of fluid.    A/P:  # Weakness: completed 1 week of self pay PT/OT -- dishcarge home with outpatient PT ordered at [facility] in [place].  # OA s/p right knee TKA: f/u Dr Arambula. Recommend topical ice, naproxen, and norco 5-10 mg q4h prn pain.  mg for dvt prevention.  # FEN: MVit, Vit C.  # COPD: prn ventolin  # OK for 1 alcoholic drink in evening.      Electronically Signed By: Marcello Taylor MD   10/29/24  9:19 PM

## 2024-10-01 NOTE — PROGRESS NOTES
*Provider Impression*    Patient is a 79 year old female who is seen today for management of multiple medical problems       #R knee OA - s/p R TKA; PT/OT, WBAT RLE, MELANIE hose, ice PRN, Norco 5/325mg q4h PRN, ASA 325mg daily for DVT prophylaxis, acetaminophen 1000mg q6h PRN, naproxen 220mg BID PRN  #Constipation - colace 100mg BID PRN, add sennokot-S 8.6/50mg 2 tabs BID  #COPD - albuterol q4h PRN  #Vitamin deficiency - ascorbic acid 250mg daily, multivitamin daily, vitamin D 1000units daily  #ACP - DNRCC-A  Follow up as needed      *Chief Complaint*     OA    *History of Present Illness*    Patient is a 80 y/o female w/ PMH as below who underwent a right total knee arthroplasty.  No intraoperative complications and she was discharged to PACU in stable condition.  She was admitted to the floor overnight for monitoring, neurovascular checks, pain control, and continued PT.  She was determined to be stable and was d/c to [facility] on 9/24.    She is seen sitting up in her room and reports pain well controlled currently at 1/10, down to 0/10, and up to 8/10 at worst. [place] and naproxen are helping. She has been doing exercises and making good progress, no new edema, no CP, SOB, gordon BONILLA (although I assist her with ambulation during the visit and she is able to ambulate hundreds of feet around the entire unit without any apparent fatigue or dyspnea), no cough, n/v, some constipation - LBM 3 days ago, no LUTS, or any other new c/o presently.     Allergies - latex  PMH - HLD, hypothyroidism, vitamin deficiency, GERD, OA, spondylosis, COPD  PSH - appendectomy, cataract extraction, rotator cuff repair, R SPENSER, L TKA  FH - Mother had heart disease, HTN  SocHx - Never smoker, Occasional EtOH    *Review of Systems*  All other systems reviewed are negative except as noted in the HPI     *Vital Signs*   Date: 9/27/24  - T: 97.8  P: 80  R: 16  BP: 110/66  SpO2: 96% on RA    *Results / Data*  CBC - Date: 9/24/24  WBC: 13.9  HGB:  12.6  HCT: 37.6  PLT: 247  ;   BMP - Date: 9/24/24  Na: 136  K: 4.0  Cl: 104  CO2: 25  BUN: 12  Cr: 0.73  Glu: 119  Ca: 9.0  ;   LFT - Date: 9/5/24  AST: 16  ALT: 14  ALP: 67  Tbili: 0.8  ;   Coags - Date:   INR:   PT:    *Physical Exam*  Gen: (+) NAD, (+) well-appearing  HEENT: (+) normocephalic, (+) MMM  Neck: (+) supple  Lungs: (+) CTAB, (-) wheezes, (-) rales, (-) rhonchi  Heart: (+) RRR, (+) S1 S2, (-) murmurs  Pulses: (+) palpable  Abd: (+) soft, (+) NT, (+) ND, (+) BS+  Ext: (-) edema, (-) deformity  MSK: (-) joint swelling  Skin: (+) warm, (+) dry, (-) rash  Neuro: (+) follows commands, (-) tremor, (+) alert

## 2024-10-02 NOTE — PROGRESS NOTES
Resident seen 24 -- MP    CC: Self pay SNF ([facility]) DC Note (32 minutes spent on discharge visit, documentation, ordering outpatient PT at [facility] in [place]).    : 1945  SNF H&P done 24  DC Summary dated 24 reviewed 24  ALlergy: Latex  DNR-CCA    S: 78 yo woman with COPD and OA admitted to self-pay SNF rehab after hospitalized <72h after elective Right TKA. No CP/SOB. Med list & Problem list reviewed.    O: VSS AFEB 207# Awake, alert, NAD. Chest CTA, Heart RRR. Ext no c/c/e. Right knee incision c/d/i with silver dressing -- small blood wicking onto visible steristrip tail but no saturation of dressing nor leakage of fluid.    A/P:  # Weakness: completed 1 week of self pay PT/OT -- dishcarge home with outpatient PT ordered at [facility] in [place].  # OA s/p right knee TKA: f/u Dr Arambula. Recommend topical ice, naproxen, and norco 5-10 mg q4h prn pain.  mg for dvt prevention.  # FEN: MVit, Vit C.  # COPD: prn ventolin  # OK for 1 alcoholic drink in evening.

## 2024-10-07 ENCOUNTER — HOSPITAL ENCOUNTER (OUTPATIENT)
Dept: VASCULAR MEDICINE | Facility: HOSPITAL | Age: 79
Discharge: HOME | End: 2024-10-07
Payer: MEDICARE

## 2024-10-07 DIAGNOSIS — M79.661 PAIN IN RIGHT LOWER LEG: ICD-10-CM

## 2024-10-07 PROCEDURE — 93971 EXTREMITY STUDY: CPT

## 2024-10-07 PROCEDURE — 93971 EXTREMITY STUDY: CPT | Performed by: STUDENT IN AN ORGANIZED HEALTH CARE EDUCATION/TRAINING PROGRAM

## 2024-10-08 ENCOUNTER — APPOINTMENT (OUTPATIENT)
Dept: VASCULAR MEDICINE | Facility: HOSPITAL | Age: 79
End: 2024-10-08
Payer: MEDICARE

## 2024-11-18 LAB
ATRIAL RATE: 67 BPM
P AXIS: 43 DEGREES
P OFFSET: 206 MS
P ONSET: 155 MS
PR INTERVAL: 142 MS
Q ONSET: 226 MS
QRS COUNT: 11 BEATS
QRS DURATION: 80 MS
QT INTERVAL: 416 MS
QTC CALCULATION(BAZETT): 439 MS
QTC FREDERICIA: 431 MS
R AXIS: 4 DEGREES
T AXIS: 28 DEGREES
T OFFSET: 434 MS
VENTRICULAR RATE: 67 BPM

## 2024-12-09 ENCOUNTER — OFFICE VISIT (OUTPATIENT)
Dept: PRIMARY CARE | Facility: CLINIC | Age: 79
End: 2024-12-09
Payer: MEDICARE

## 2024-12-09 ENCOUNTER — APPOINTMENT (OUTPATIENT)
Dept: LAB | Facility: LAB | Age: 79
End: 2024-12-09
Payer: MEDICARE

## 2024-12-09 VITALS
BODY MASS INDEX: 28.14 KG/M2 | HEIGHT: 71 IN | HEART RATE: 78 BPM | OXYGEN SATURATION: 98 % | DIASTOLIC BLOOD PRESSURE: 70 MMHG | WEIGHT: 201 LBS | SYSTOLIC BLOOD PRESSURE: 122 MMHG

## 2024-12-09 DIAGNOSIS — R53.82 CHRONIC FATIGUE: ICD-10-CM

## 2024-12-09 DIAGNOSIS — R73.9 HYPERGLYCEMIA: ICD-10-CM

## 2024-12-09 DIAGNOSIS — K21.9 GASTROESOPHAGEAL REFLUX DISEASE WITHOUT ESOPHAGITIS: ICD-10-CM

## 2024-12-09 DIAGNOSIS — E55.9 VITAMIN D DEFICIENCY: ICD-10-CM

## 2024-12-09 DIAGNOSIS — Z71.89 COUNSELING REGARDING ADVANCED CARE PLANNING AND GOALS OF CARE: ICD-10-CM

## 2024-12-09 DIAGNOSIS — Z23 ENCOUNTER FOR IMMUNIZATION: ICD-10-CM

## 2024-12-09 DIAGNOSIS — E78.2 MIXED HYPERLIPIDEMIA: ICD-10-CM

## 2024-12-09 DIAGNOSIS — M47.816 LUMBAR SPONDYLOSIS: ICD-10-CM

## 2024-12-09 DIAGNOSIS — M15.0 PRIMARY OSTEOARTHRITIS INVOLVING MULTIPLE JOINTS: ICD-10-CM

## 2024-12-09 DIAGNOSIS — Z00.00 ANNUAL PHYSICAL EXAM: Primary | ICD-10-CM

## 2024-12-09 DIAGNOSIS — J44.9 CHRONIC OBSTRUCTIVE PULMONARY DISEASE, UNSPECIFIED COPD TYPE (MULTI): ICD-10-CM

## 2024-12-09 DIAGNOSIS — Z01.89 ENCOUNTER FOR ROUTINE LABORATORY TESTING: ICD-10-CM

## 2024-12-09 PROBLEM — M15.9 GENERALIZED OSTEOARTHRITIS: Status: RESOLVED | Noted: 2024-01-29 | Resolved: 2024-12-09

## 2024-12-09 PROBLEM — E78.5 HLD (HYPERLIPIDEMIA): Status: ACTIVE | Noted: 2024-01-29

## 2024-12-09 PROBLEM — R26.9 ABNORMAL GAIT: Status: RESOLVED | Noted: 2023-09-20 | Resolved: 2024-12-09

## 2024-12-09 PROBLEM — R26.2 DIFFICULTY WALKING: Status: RESOLVED | Noted: 2023-09-20 | Resolved: 2024-12-09

## 2024-12-09 PROBLEM — M54.30 SCIATICA: Status: RESOLVED | Noted: 2023-09-20 | Resolved: 2024-12-09

## 2024-12-09 PROBLEM — R53.1 WEAKNESS: Status: RESOLVED | Noted: 2023-09-20 | Resolved: 2024-12-09

## 2024-12-09 PROBLEM — M17.11 LOCALIZED OSTEOARTHRITIS OF RIGHT KNEE: Status: RESOLVED | Noted: 2024-09-23 | Resolved: 2024-12-09

## 2024-12-09 PROBLEM — M19.079 ARTHRITIS OF FOOT: Status: RESOLVED | Noted: 2023-09-20 | Resolved: 2024-12-09

## 2024-12-09 LAB
ALBUMIN SERPL BCP-MCNC: 4.1 G/DL (ref 3.4–5)
ALP SERPL-CCNC: 79 U/L (ref 33–136)
ALT SERPL W P-5'-P-CCNC: 15 U/L (ref 7–45)
ANION GAP SERPL CALCULATED.3IONS-SCNC: 10 MMOL/L (ref 10–20)
AST SERPL W P-5'-P-CCNC: 13 U/L (ref 9–39)
BASOPHILS # BLD AUTO: 0.06 X10*3/UL (ref 0–0.1)
BASOPHILS NFR BLD AUTO: 0.6 %
BILIRUB DIRECT SERPL-MCNC: 0.1 MG/DL (ref 0–0.3)
BILIRUB SERPL-MCNC: 0.7 MG/DL (ref 0–1.2)
BUN SERPL-MCNC: 16 MG/DL (ref 6–23)
CALCIUM SERPL-MCNC: 10.1 MG/DL (ref 8.6–10.3)
CHLORIDE SERPL-SCNC: 105 MMOL/L (ref 98–107)
CHOLEST SERPL-MCNC: 183 MG/DL (ref 0–199)
CHOLEST/HDLC SERPL: 3 {RATIO}
CO2 SERPL-SCNC: 28 MMOL/L (ref 21–32)
CREAT SERPL-MCNC: 0.83 MG/DL (ref 0.5–1.05)
EGFRCR SERPLBLD CKD-EPI 2021: 72 ML/MIN/1.73M*2
EOSINOPHIL # BLD AUTO: 0.36 X10*3/UL (ref 0–0.4)
EOSINOPHIL NFR BLD AUTO: 3.6 %
ERYTHROCYTE [DISTWIDTH] IN BLOOD BY AUTOMATED COUNT: 12.9 % (ref 11.5–14.5)
GLUCOSE SERPL-MCNC: 95 MG/DL (ref 74–99)
HCT VFR BLD AUTO: 44 % (ref 36–46)
HDLC SERPL-MCNC: 60.4 MG/DL
HGB BLD-MCNC: 15.2 G/DL (ref 12–16)
IMM GRANULOCYTES # BLD AUTO: 0.04 X10*3/UL (ref 0–0.5)
IMM GRANULOCYTES NFR BLD AUTO: 0.4 % (ref 0–0.9)
LDLC SERPL CALC-MCNC: 88 MG/DL
LYMPHOCYTES # BLD AUTO: 2.79 X10*3/UL (ref 0.8–3)
LYMPHOCYTES NFR BLD AUTO: 27.7 %
MCH RBC QN AUTO: 32.1 PG (ref 26–34)
MCHC RBC AUTO-ENTMCNC: 34.5 G/DL (ref 32–36)
MCV RBC AUTO: 93 FL (ref 80–100)
MONOCYTES # BLD AUTO: 0.62 X10*3/UL (ref 0.05–0.8)
MONOCYTES NFR BLD AUTO: 6.2 %
NEUTROPHILS # BLD AUTO: 6.2 X10*3/UL (ref 1.6–5.5)
NEUTROPHILS NFR BLD AUTO: 61.5 %
NON HDL CHOLESTEROL: 123 MG/DL (ref 0–149)
NRBC BLD-RTO: 0 /100 WBCS (ref 0–0)
PLATELET # BLD AUTO: 368 X10*3/UL (ref 150–450)
POTASSIUM SERPL-SCNC: 4.4 MMOL/L (ref 3.5–5.3)
PROT SERPL-MCNC: 6.5 G/DL (ref 6.4–8.2)
RBC # BLD AUTO: 4.73 X10*6/UL (ref 4–5.2)
SODIUM SERPL-SCNC: 139 MMOL/L (ref 136–145)
TRIGL SERPL-MCNC: 174 MG/DL (ref 0–149)
TSH SERPL-ACNC: 1.86 MIU/L (ref 0.44–3.98)
VLDL: 35 MG/DL (ref 0–40)
WBC # BLD AUTO: 10.1 X10*3/UL (ref 4.4–11.3)

## 2024-12-09 PROCEDURE — 1158F ADVNC CARE PLAN TLK DOCD: CPT | Performed by: STUDENT IN AN ORGANIZED HEALTH CARE EDUCATION/TRAINING PROGRAM

## 2024-12-09 PROCEDURE — 1126F AMNT PAIN NOTED NONE PRSNT: CPT | Performed by: STUDENT IN AN ORGANIZED HEALTH CARE EDUCATION/TRAINING PROGRAM

## 2024-12-09 PROCEDURE — 1170F FXNL STATUS ASSESSED: CPT | Performed by: STUDENT IN AN ORGANIZED HEALTH CARE EDUCATION/TRAINING PROGRAM

## 2024-12-09 PROCEDURE — 1160F RVW MEDS BY RX/DR IN RCRD: CPT | Performed by: STUDENT IN AN ORGANIZED HEALTH CARE EDUCATION/TRAINING PROGRAM

## 2024-12-09 PROCEDURE — 80061 LIPID PANEL: CPT

## 2024-12-09 PROCEDURE — 99215 OFFICE O/P EST HI 40 MIN: CPT | Performed by: STUDENT IN AN ORGANIZED HEALTH CARE EDUCATION/TRAINING PROGRAM

## 2024-12-09 PROCEDURE — G0439 PPPS, SUBSEQ VISIT: HCPCS | Performed by: STUDENT IN AN ORGANIZED HEALTH CARE EDUCATION/TRAINING PROGRAM

## 2024-12-09 PROCEDURE — 80053 COMPREHEN METABOLIC PANEL: CPT

## 2024-12-09 PROCEDURE — 1159F MED LIST DOCD IN RCRD: CPT | Performed by: STUDENT IN AN ORGANIZED HEALTH CARE EDUCATION/TRAINING PROGRAM

## 2024-12-09 PROCEDURE — 99214 OFFICE O/P EST MOD 30 MIN: CPT | Performed by: STUDENT IN AN ORGANIZED HEALTH CARE EDUCATION/TRAINING PROGRAM

## 2024-12-09 PROCEDURE — 83036 HEMOGLOBIN GLYCOSYLATED A1C: CPT

## 2024-12-09 PROCEDURE — 82248 BILIRUBIN DIRECT: CPT

## 2024-12-09 PROCEDURE — 85025 COMPLETE CBC W/AUTO DIFF WBC: CPT

## 2024-12-09 PROCEDURE — 36415 COLL VENOUS BLD VENIPUNCTURE: CPT

## 2024-12-09 PROCEDURE — 1036F TOBACCO NON-USER: CPT | Performed by: STUDENT IN AN ORGANIZED HEALTH CARE EDUCATION/TRAINING PROGRAM

## 2024-12-09 PROCEDURE — 1123F ACP DISCUSS/DSCN MKR DOCD: CPT | Performed by: STUDENT IN AN ORGANIZED HEALTH CARE EDUCATION/TRAINING PROGRAM

## 2024-12-09 PROCEDURE — 84443 ASSAY THYROID STIM HORMONE: CPT

## 2024-12-09 PROCEDURE — 82306 VITAMIN D 25 HYDROXY: CPT

## 2024-12-09 RX ORDER — BISMUTH SUBSALICYLATE 262 MG
1 TABLET,CHEWABLE ORAL DAILY
Status: SHIPPED | COMMUNITY
Start: 2024-12-09

## 2024-12-09 RX ORDER — CHOLECALCIFEROL (VITAMIN D3) 25 MCG
1000 TABLET ORAL DAILY
Status: SHIPPED | COMMUNITY
Start: 2024-12-09

## 2024-12-09 ASSESSMENT — ENCOUNTER SYMPTOMS
HEMATOLOGIC/LYMPHATIC NEGATIVE: 1
CARDIOVASCULAR NEGATIVE: 1
EYES NEGATIVE: 1
ALLERGIC/IMMUNOLOGIC NEGATIVE: 1
ENDOCRINE NEGATIVE: 1
RESPIRATORY NEGATIVE: 1
MUSCULOSKELETAL NEGATIVE: 1
CONSTITUTIONAL NEGATIVE: 1
NEUROLOGICAL NEGATIVE: 1
PSYCHIATRIC NEGATIVE: 1
GASTROINTESTINAL NEGATIVE: 1

## 2024-12-09 ASSESSMENT — PATIENT HEALTH QUESTIONNAIRE - PHQ9
2. FEELING DOWN, DEPRESSED OR HOPELESS: NOT AT ALL
1. LITTLE INTEREST OR PLEASURE IN DOING THINGS: NOT AT ALL
SUM OF ALL RESPONSES TO PHQ9 QUESTIONS 1 AND 2: 0

## 2024-12-09 ASSESSMENT — ACTIVITIES OF DAILY LIVING (ADL)
MANAGING_FINANCES: INDEPENDENT
GROCERY_SHOPPING: INDEPENDENT
DRESSING: INDEPENDENT
DOING_HOUSEWORK: INDEPENDENT
BATHING: INDEPENDENT
TAKING_MEDICATION: INDEPENDENT

## 2024-12-09 ASSESSMENT — PAIN SCALES - GENERAL: PAINLEVEL_OUTOF10: 0-NO PAIN

## 2024-12-09 NOTE — PATIENT INSTRUCTIONS
- Patient recently underwent knee replacement surgery. Postoperative course uncomplicated.  - Otherwise, the patient feels well and denies any acute symptoms / concerns at this time.  - Blood pressure at goal today.  - Encouraged continued dietary, exercise, and lifestyle modification.  - Significant medication and problem list reconciliation done today.     Discussed routine and/or preventative care with the patient as outlined below:  - Labwork:   - Patient appears to be due for labwork. Ordered today.   - Patient will not require labwork for their next appointment.  - Imaging:   - Patient does not appear to be due for routine imaging at this time.  - Immunizations:   - Encouraged the patient to get the pneumonia (prevnar-20) immunization.  - Encouraged the patient to get their shingles (shingrix) immunizations.  - Encouraged the tetanus (TDAP) booster.  - Discussed the RSV immunization.     ADVANCED CARE PLANNING  Advanced Care Planning was discussed with patient.  Encouraged the patient to confirm that Living Will and Healthcare Power of  (HCPoA) are accurate and up to date.  Encouraged the patient to confirm that our office be provided a copy of any documentation in the event that anything changes.  
Endometrial polyp  04/09/2018    Active  Gavin Cota

## 2024-12-09 NOTE — PROGRESS NOTES
[facility]: [facility]  MEDICARE WELLNESS EXAM      Mariaelena Goodwin is a 79 y.o. female that is presenting today for Annual Exam.    Assessment/Plan    - Patient recently underwent knee replacement surgery. Postoperative course uncomplicated.  - Otherwise, the patient feels well and denies any acute symptoms / concerns at this time.  - Blood pressure at goal today.  - Encouraged continued dietary, exercise, and lifestyle modification.  - Significant medication and problem list reconciliation done today.     Discussed routine and/or preventative care with the patient as outlined below:  - Labwork:   - Patient appears to be due for labwork. Ordered today.   - Patient will not require labwork for their next appointment.  - Imaging:   - Patient does not appear to be due for routine imaging at this time.  - Immunizations:   - Encouraged the patient to get the pneumonia (prevnar-20) immunization.  - Encouraged the patient to get their shingles (shingrix) immunizations.  - Encouraged the tetanus (TDAP) booster.  - Discussed the RSV immunization.     ADVANCED CARE PLANNING  Advanced Care Planning was discussed with patient.  Encouraged the patient to confirm that Living Will and Healthcare Power of  (HCPoA) are accurate and up to date.  Encouraged the patient to confirm that our office be provided a copy of any documentation in the event that anything changes.    Diagnoses and all orders for this visit:  Annual physical exam  -     Follow Up In Primary Care; Future  Counseling regarding advanced care planning and goals of care  Encounter for routine laboratory testing  Vitamin D deficiency  -     Vitamin D 25-Hydroxy,Total (for eval of Vitamin D levels); Future  Chronic fatigue  -     CBC and Auto Differential; Future  -     Basic Metabolic Panel; Future  -     TSH with reflex to Free T4 if abnormal; Future  Hyperglycemia  -     Hemoglobin A1C; Future  Encounter for immunization  Chronic  obstructive pulmonary disease, unspecified COPD type (Multi)  Gastroesophageal reflux disease without esophagitis  Primary osteoarthritis involving multiple joints  Mixed hyperlipidemia  -     Hepatic Function Panel; Future  -     Lipid Panel; Future  Lumbar spondylosis  Other orders  -     multivitamin tablet; Take 1 tablet by mouth once daily.  -     cholecalciferol (Vitamin D3) 25 MCG (1000 UT) tablet; Take 1 tablet (1,000 Units) by mouth once daily.    Current Outpatient Medications   Medication Instructions    albuterol (Ventolin HFA) 90 mcg/actuation inhaler INHALE 1-2 PUFFS EVERY 4-6 HOURS AS NEEDED AND AS DIRECTED.    cholecalciferol (VITAMIN D3) 1,000 Units, oral, Daily    multivitamin tablet 1 tablet, oral, Daily     Subjective   - The patient otherwise feels well and denies any acute symptoms or concerns at this time.  - The patient denies any changes or progression of their chronic medical problems.  - The patient denies any problems or concerns with their medications.      Review of Systems   Constitutional: Negative.    HENT: Negative.     Eyes: Negative.    Respiratory: Negative.     Cardiovascular: Negative.    Gastrointestinal: Negative.    Endocrine: Negative.    Genitourinary: Negative.    Musculoskeletal: Negative.    Skin: Negative.    Allergic/Immunologic: Negative.    Neurological: Negative.    Hematological: Negative.    Psychiatric/Behavioral: Negative.     All other systems reviewed and are negative.    Objective   Vitals:    12/09/24 1449   BP: 122/70   Pulse: 78   SpO2: 98%      Body mass index is 28.05 kg/m².  Physical Exam  Vitals and nursing note reviewed.   Constitutional:       General: She is not in acute distress.     Appearance: Normal appearance. She is not ill-appearing.   HENT:      Head: Normocephalic and atraumatic.      Right Ear: Tympanic membrane, ear canal and external ear normal. There is no impacted cerumen.      Left Ear: Tympanic membrane, ear canal and external ear  normal. There is no impacted cerumen.      Nose: Nose normal.      Mouth/Throat:      Mouth: Mucous membranes are moist.      Pharynx: Oropharynx is clear. No oropharyngeal exudate or posterior oropharyngeal erythema.   Eyes:      General: No scleral icterus.        Right eye: No discharge.         Left eye: No discharge.      Extraocular Movements: Extraocular movements intact.      Conjunctiva/sclera: Conjunctivae normal.      Pupils: Pupils are equal, round, and reactive to light.   Neck:      Vascular: No carotid bruit.   Cardiovascular:      Rate and Rhythm: Normal rate and regular rhythm.      Pulses: Normal pulses.      Heart sounds: Normal heart sounds. No murmur heard.     No friction rub. No gallop.   Pulmonary:      Effort: Pulmonary effort is normal. No respiratory distress.      Breath sounds: Normal breath sounds.   Abdominal:      General: Abdomen is flat. Bowel sounds are normal. There is no distension.      Palpations: Abdomen is soft.      Tenderness: There is no abdominal tenderness.      Hernia: No hernia is present.   Musculoskeletal:         General: No swelling or tenderness. Normal range of motion.   Lymphadenopathy:      Cervical: No cervical adenopathy.   Skin:     General: Skin is warm and dry.      Capillary Refill: Capillary refill takes less than 2 seconds.      Coloration: Skin is not jaundiced.      Findings: No rash.   Neurological:      General: No focal deficit present.      Mental Status: She is alert and oriented to person, place, and time. Mental status is at baseline.   Psychiatric:         Mood and Affect: Mood normal.         Behavior: Behavior normal.       Diagnostic Results   Lab Results   Component Value Date    GLUCOSE 119 (H) 09/24/2024    CALCIUM 9.0 09/24/2024     09/24/2024    K 4.0 09/24/2024    CO2 25 09/24/2024     09/24/2024    BUN 12 09/24/2024    CREATININE 0.73 09/24/2024     Lab Results   Component Value Date    ALT 14 09/05/2024    AST 16  "09/05/2024    ALKPHOS 67 09/05/2024    BILITOT 0.8 09/05/2024     Lab Results   Component Value Date    WBC 13.9 (H) 09/24/2024    HGB 12.6 09/24/2024    HCT 37.6 09/24/2024    MCV 94 09/24/2024     09/24/2024     No results found for: \"CHOL\"  No results found for: \"HDL\"  No results found for: \"LDLCALC\"  No results found for: \"TRIG\"  No components found for: \"CHOLHDL\"  No results found for: \"HGBA1C\"  Other labs not included in the list above reviewed either before or during this encounter.    History   Past Medical History:   Diagnosis Date    Acute left ankle pain 05/02/2022    Arthralgia of hip 01/29/2024    Arthritis     COPD (chronic obstructive pulmonary disease) (Multi)     GERD (gastroesophageal reflux disease)     Hyperlipidemia     Hypothyroid     Pain of foot 01/29/2024    Peroneal tendon tear, left, initial encounter 05/02/2022    Personal history of other diseases of the respiratory system 11/20/2020    History of sinus problem    Primary osteoarthritis of right knee     Respiratory symptoms 01/29/2024    Sciatica     Shortness of breath 01/29/2024     Past Surgical History:   Procedure Laterality Date    APPENDECTOMY      CATARACT EXTRACTION      ROTATOR CUFF REPAIR Right     Rotator Cuff Repair    TOTAL HIP ARTHROPLASTY Right     TOTAL KNEE ARTHROPLASTY Left     Knee Replacement     Family History   Problem Relation Name Age of Onset    Heart disease Mother      Hypertension Mother       Social History     Socioeconomic History    Marital status:      Spouse name: Not on file    Number of children: Not on file    Years of education: Not on file    Highest education level: Not on file   Occupational History    Not on file   Tobacco Use    Smoking status: Never    Smokeless tobacco: Never   Vaping Use    Vaping status: Never Used   Substance and Sexual Activity    Alcohol use: Yes     Alcohol/week: 7.0 standard drinks of alcohol     Types: 7 Standard drinks or equivalent per week    Drug " use: Never    Sexual activity: Not on file   Other Topics Concern    Not on file   Social History Narrative    Not on file     Social Drivers of Health     Financial Resource Strain: Low Risk  (9/23/2024)    Overall Financial Resource Strain (CARDIA)     Difficulty of Paying Living Expenses: Not hard at all   Food Insecurity: Not on file   Transportation Needs: No Transportation Needs (9/23/2024)    PRAPARE - Transportation     Lack of Transportation (Medical): No     Lack of Transportation (Non-Medical): No   Physical Activity: Not on file   Stress: Not on file   Social Connections: Not on file   Intimate Partner Violence: Not on file   Housing Stability: Low Risk  (9/23/2024)    Housing Stability Vital Sign     Unable to Pay for Housing in the Last Year: No     Number of Times Moved in the Last Year: 0     Homeless in the Last Year: No     Allergies   Allergen Reactions    Latex Rash     Current Outpatient Medications on File Prior to Visit   Medication Sig Dispense Refill    albuterol (Ventolin HFA) 90 mcg/actuation inhaler INHALE 1-2 PUFFS EVERY 4-6 HOURS AS NEEDED AND AS DIRECTED.      [DISCONTINUED] ascorbic acid (Vitamin C) 250 mg tablet Take 1 tablet (250 mg) by mouth once daily.      [DISCONTINUED] cholecalciferol (Vitamin D3) 25 MCG (1000 UT) tablet Take 1 tablet (1,000 Units) by mouth once daily.      [DISCONTINUED] multivitamin tablet Take 1 tablet by mouth once daily.       No current facility-administered medications on file prior to visit.     Immunization History   Administered Date(s) Administered    Flu vaccine, quadrivalent, high-dose, preservative free, age 65y+ (FLUZONE) 09/12/2023    Flu vaccine, trivalent, preservative free, HIGH-DOSE, age 65y+ (Fluzone) 10/17/2017, 09/28/2018    Hepatitis A vaccine, age 19 years and greater (HAVRIX) 11/10/2015, 02/07/2017    Influenza, seasonal, injectable 02/07/2017    Vivian SARS-CoV-2 Vaccination 08/05/2021, 11/29/2021    Tdap vaccine, age 7 year and  older (BOOSTRIX, ADACEL) 10/01/2011    Typhoid, oral 02/07/2017     Patient's medical history was reviewed and updated either before or during this encounter.     Sohail Leos MD

## 2024-12-10 LAB
25(OH)D3 SERPL-MCNC: 189 NG/ML (ref 30–100)
EST. AVERAGE GLUCOSE BLD GHB EST-MCNC: 100 MG/DL
HBA1C MFR BLD: 5.1 %

## 2025-02-18 ENCOUNTER — TELEPHONE (OUTPATIENT)
Dept: PRIMARY CARE | Facility: CLINIC | Age: 80
End: 2025-02-18
Payer: MEDICARE

## 2025-02-18 DIAGNOSIS — R09.81 SINUS CONGESTION: ICD-10-CM

## 2025-02-19 RX ORDER — AZITHROMYCIN 250 MG/1
TABLET, FILM COATED ORAL
Qty: 6 TABLET | Refills: 0 | Status: SHIPPED | OUTPATIENT
Start: 2025-02-19 | End: 2025-02-24

## 2025-05-28 DIAGNOSIS — J01.90 ACUTE NON-RECURRENT SINUSITIS, UNSPECIFIED LOCATION: Primary | ICD-10-CM

## 2025-05-28 RX ORDER — AZITHROMYCIN 250 MG/1
TABLET, FILM COATED ORAL
Qty: 6 TABLET | Refills: 0 | Status: SHIPPED | OUTPATIENT
Start: 2025-05-28 | End: 2025-06-02

## (undated) DEVICE — SUTURE, FIBERWIRE 5

## (undated) DEVICE — SEALER, BIPOLAR, AQUA MANTYS 6.0

## (undated) DEVICE — SUTURE, FIBERWIRE 2, T-5 TAPER NEEDLE, 38"

## (undated) DEVICE — TRAY, MINOR, SINGLE BASIN, STERILE

## (undated) DEVICE — SUTURE, QUILL, BARBED, PDO, 2, 24 X 24CM, T8 36MM TAPER POINT, 1/2 CIRCLE

## (undated) DEVICE — TIP, SUCTION, YANKAUER, FLEXIBLE

## (undated) DEVICE — WIPE, FILM BARRIER, CAVILON, 2 IN 1, NO STING, 1ML

## (undated) DEVICE — DRESSING, MEPILEX BORDER, POST-OP AG, 4 X 10 IN

## (undated) DEVICE — CUFF, TOURNIQUET, 34 X 4, SNGL PORT/SNGL BLADDER, DISP, LF

## (undated) DEVICE — STRIP, SKIN CLOSURE, STERI STRIP, REINFORCED, 0.5 X 4 IN

## (undated) DEVICE — SUTURE, V-LOC, 3-0, 23IN, P-12, 90 ABS

## (undated) DEVICE — BANDAGE, ELASTIC,  6 IN X 11 YDS, STERILE, LF

## (undated) DEVICE — SUTURE, CTD, VICRYL, 2-0, UND, BR, CT-2

## (undated) DEVICE — SUTURE, VICRYL, 1, 36 IN, CT-1, UNDYED

## (undated) DEVICE — NEEDLE, SPINAL, 20 G X 3.5 IN, YELLOW HUB

## (undated) DEVICE — KIT, MINOR, DOUBLE BASIN

## (undated) DEVICE — BOWL, MIXING, W/SPATULA, DISPOSABLE

## (undated) DEVICE — CATHETER TRAY, SURESTEP, 14FR, PRECONNECTED DRAIN BAG

## (undated) DEVICE — SYRINGE, 60 CC, LUER LOCK, MONOJECT

## (undated) DEVICE — IRRIGATION SYSTEM, WOUND, SURGIPHOR, 450ML, STERILE

## (undated) DEVICE — HOOD, SURGICAL, FLYTE, T7 PLUS, PEEL AWAY SHIELD

## (undated) DEVICE — COVER, TABLE, 44X90

## (undated) DEVICE — BLADE, RECIPROCATOR 12.5 X 76 X 0.9MM

## (undated) DEVICE — DRAPE PACK, TOTAL KNEE, CUSTOM, GEAUGA

## (undated) DEVICE — ADHESIVE, SKIN, DERMABOND ADVANCED, 15CM, PEN-STYLE

## (undated) DEVICE — BLADE, OSCILLATING, LARGE BONE, 19.5814 X 90 X 1.27 MM

## (undated) DEVICE — BLADE, LARGE BONE, HALL, OSCILLATING, 13X90X1.27MM